# Patient Record
Sex: MALE | Race: BLACK OR AFRICAN AMERICAN | Employment: FULL TIME | URBAN - METROPOLITAN AREA
[De-identification: names, ages, dates, MRNs, and addresses within clinical notes are randomized per-mention and may not be internally consistent; named-entity substitution may affect disease eponyms.]

---

## 2017-03-02 ENCOUNTER — OFFICE VISIT (OUTPATIENT)
Dept: FAMILY MEDICINE CLINIC | Facility: CLINIC | Age: 22
End: 2017-03-02

## 2017-03-02 VITALS
OXYGEN SATURATION: 98 % | SYSTOLIC BLOOD PRESSURE: 124 MMHG | DIASTOLIC BLOOD PRESSURE: 74 MMHG | TEMPERATURE: 98 F | RESPIRATION RATE: 16 BRPM | HEART RATE: 47 BPM

## 2017-03-02 DIAGNOSIS — J06.9 UPPER RESPIRATORY VIRUS: Primary | ICD-10-CM

## 2017-03-02 DIAGNOSIS — J02.9 SORE THROAT: ICD-10-CM

## 2017-03-02 LAB
CONTROL LINE PRESENT WITH A CLEAR BACKGROUND (YES/NO): YES YES/NO
STREP GRP A CUL-SCR: NEGATIVE

## 2017-03-02 PROCEDURE — 99203 OFFICE O/P NEW LOW 30 MIN: CPT | Performed by: NURSE PRACTITIONER

## 2017-03-02 PROCEDURE — 87880 STREP A ASSAY W/OPTIC: CPT | Performed by: NURSE PRACTITIONER

## 2017-03-02 RX ORDER — FLUTICASONE PROPIONATE 50 MCG
2 SPRAY, SUSPENSION (ML) NASAL DAILY
Qty: 1 BOTTLE | Refills: 0 | Status: SHIPPED | OUTPATIENT
Start: 2017-03-02 | End: 2017-03-16

## 2017-03-02 RX ORDER — MONTELUKAST SODIUM 10 MG/1
10 TABLET ORAL DAILY
Qty: 90 TABLET | Refills: 0 | Status: SHIPPED | OUTPATIENT
Start: 2017-03-02 | End: 2017-06-21

## 2017-03-02 NOTE — PROGRESS NOTES
CHIEF COMPLAINT:   Patient presents with:  Sinus Problem: started tuesday. sinus pressure, headache, nausea, runny nose. sore throat. has taken motrin for symptoms. tactile fevers. appetite poor. no coughing. only sneezing. hx of asthma.  has been using NOSE: nostrils patent, clear nasal discharge, nasal mucosa inflamed  THROAT: oral mucosa pink, moist. Posterior pharynx erythematous and injected. No exudates. Tonsils 2/4.   Breath non malodorous   NECK: supple  LUNGS: clear to auscultation bilaterally, no You have a viral upper respiratory illness (URI), which is another term for the common cold. This illness is contagious during the first few days. It is spread through the air by coughing and sneezing.  It may also be spread by direct contact (touching the · Cough with lots of colored sputum (mucus)  · Severe headache; face, neck, or ear pain  · Difficulty swallowing due to throat pain  · Fever of 100.4°F (38°C)  Call 911, or get immediate medical care  Call emergency services right away if any of these occu

## 2017-06-21 ENCOUNTER — OFFICE VISIT (OUTPATIENT)
Dept: FAMILY MEDICINE CLINIC | Facility: CLINIC | Age: 22
End: 2017-06-21

## 2017-06-21 ENCOUNTER — APPOINTMENT (OUTPATIENT)
Dept: LAB | Age: 22
End: 2017-06-21
Attending: FAMILY MEDICINE
Payer: COMMERCIAL

## 2017-06-21 VITALS
BODY MASS INDEX: 32.49 KG/M2 | SYSTOLIC BLOOD PRESSURE: 165 MMHG | HEIGHT: 67 IN | WEIGHT: 207 LBS | RESPIRATION RATE: 16 BRPM | DIASTOLIC BLOOD PRESSURE: 72 MMHG | HEART RATE: 71 BPM | TEMPERATURE: 98 F

## 2017-06-21 DIAGNOSIS — J45.30 MILD PERSISTENT ASTHMA WITHOUT COMPLICATION: Primary | ICD-10-CM

## 2017-06-21 DIAGNOSIS — Z11.3 SCREENING FOR STD (SEXUALLY TRANSMITTED DISEASE): ICD-10-CM

## 2017-06-21 DIAGNOSIS — Z71.85 HPV VACCINE COUNSELING: ICD-10-CM

## 2017-06-21 PROCEDURE — 90651 9VHPV VACCINE 2/3 DOSE IM: CPT | Performed by: FAMILY MEDICINE

## 2017-06-21 PROCEDURE — 90471 IMMUNIZATION ADMIN: CPT | Performed by: FAMILY MEDICINE

## 2017-06-21 PROCEDURE — 87591 N.GONORRHOEAE DNA AMP PROB: CPT

## 2017-06-21 PROCEDURE — 36415 COLL VENOUS BLD VENIPUNCTURE: CPT

## 2017-06-21 PROCEDURE — 87389 HIV-1 AG W/HIV-1&-2 AB AG IA: CPT

## 2017-06-21 PROCEDURE — 99214 OFFICE O/P EST MOD 30 MIN: CPT | Performed by: FAMILY MEDICINE

## 2017-06-21 PROCEDURE — 87491 CHLMYD TRACH DNA AMP PROBE: CPT

## 2017-06-21 PROCEDURE — 86780 TREPONEMA PALLIDUM: CPT

## 2017-06-21 PROCEDURE — 99212 OFFICE O/P EST SF 10 MIN: CPT | Performed by: FAMILY MEDICINE

## 2017-06-21 RX ORDER — ALBUTEROL SULFATE 90 UG/1
2 AEROSOL, METERED RESPIRATORY (INHALATION) EVERY 6 HOURS PRN
Qty: 1 INHALER | Refills: 1 | Status: SHIPPED | OUTPATIENT
Start: 2017-06-21 | End: 2021-09-10

## 2017-06-21 RX ORDER — MONTELUKAST SODIUM 10 MG/1
10 TABLET ORAL DAILY
Qty: 90 TABLET | Refills: 3 | Status: SHIPPED | OUTPATIENT
Start: 2017-06-21 | End: 2018-06-16

## 2017-06-21 NOTE — PROGRESS NOTES
HPI:    Diandra Phelps is a 19year old male presents to clinic for follow up regarding asthma. States that he uses inhaler 1-2 times a month, last time was 3 weeks back when he had a cold. Denies night waking with symptoms.  Takes Singulair every night well-nourished, and in no distress. HENT:   Head: Normocephalic and atraumatic. Neck: Normal range of motion. Neck supple. No thyromegaly present. Cardiovascular: Normal rate, regular rhythm, normal heart sounds and intact distal pulses.     Pulmonary

## 2021-04-09 ENCOUNTER — TELEPHONE (OUTPATIENT)
Dept: FAMILY MEDICINE CLINIC | Facility: CLINIC | Age: 26
End: 2021-04-09

## 2021-04-09 NOTE — TELEPHONE ENCOUNTER
Pt is requesting for us to get his medical records from [facility]   Phone #[phone] SILVESTRE#[phone]      Please advise. Thank you.

## 2021-04-09 NOTE — TELEPHONE ENCOUNTER
Pt advised he'd have to sign a WADE form to have records requested. Pt will either stop by today or will do it on Monday, during his OV.

## 2021-04-12 ENCOUNTER — OFFICE VISIT (OUTPATIENT)
Dept: FAMILY MEDICINE CLINIC | Facility: CLINIC | Age: 26
End: 2021-04-12
Payer: COMMERCIAL

## 2021-04-12 VITALS
RESPIRATION RATE: 18 BRPM | DIASTOLIC BLOOD PRESSURE: 88 MMHG | WEIGHT: 230.63 LBS | HEART RATE: 83 BPM | BODY MASS INDEX: 36.2 KG/M2 | SYSTOLIC BLOOD PRESSURE: 131 MMHG | HEIGHT: 67 IN | TEMPERATURE: 98 F

## 2021-04-12 DIAGNOSIS — R56.9 SEIZURE (HCC): Primary | ICD-10-CM

## 2021-04-12 PROCEDURE — 3075F SYST BP GE 130 - 139MM HG: CPT | Performed by: FAMILY MEDICINE

## 2021-04-12 PROCEDURE — 3008F BODY MASS INDEX DOCD: CPT | Performed by: FAMILY MEDICINE

## 2021-04-12 PROCEDURE — 99202 OFFICE O/P NEW SF 15 MIN: CPT | Performed by: FAMILY MEDICINE

## 2021-04-12 PROCEDURE — 3079F DIAST BP 80-89 MM HG: CPT | Performed by: FAMILY MEDICINE

## 2021-04-12 RX ORDER — LEVETIRACETAM 500 MG/1
TABLET ORAL
COMMUNITY
Start: 2021-03-20 | End: 2021-04-13

## 2021-04-12 RX ORDER — FEXOFENADINE HCL 180 MG/1
180 TABLET ORAL DAILY
COMMUNITY
End: 2021-11-09

## 2021-04-12 NOTE — PROGRESS NOTES
HPI:    Florentino Sparrow is a 30year old male presents to clinic for hospital follow-up. 3 weeks back, patient was in [place]. Was admitted for 5 days, told he had a seizure. Had a seizure many years back as a child, has not had one since then.   Was no rhythm. Heart sounds: Normal heart sounds. Pulmonary:      Effort: Pulmonary effort is normal.      Breath sounds: Normal breath sounds. No wheezing.          ASSESSMENT/PLAN:   (R56.9) Seizure ([address]  (primary encounter diagnosis)  Plan: [facility]

## 2021-04-13 ENCOUNTER — TELEPHONE (OUTPATIENT)
Dept: NEUROLOGY | Facility: CLINIC | Age: 26
End: 2021-04-13

## 2021-04-13 ENCOUNTER — OFFICE VISIT (OUTPATIENT)
Dept: NEUROLOGY | Facility: CLINIC | Age: 26
End: 2021-04-13
Payer: COMMERCIAL

## 2021-04-13 VITALS
DIASTOLIC BLOOD PRESSURE: 68 MMHG | BODY MASS INDEX: 35.63 KG/M2 | HEART RATE: 64 BPM | SYSTOLIC BLOOD PRESSURE: 100 MMHG | HEIGHT: 67 IN | WEIGHT: 227 LBS

## 2021-04-13 DIAGNOSIS — G40.909 NONINTRACTABLE EPILEPSY WITHOUT STATUS EPILEPTICUS, UNSPECIFIED EPILEPSY TYPE (HCC): Primary | ICD-10-CM

## 2021-04-13 PROCEDURE — 3078F DIAST BP <80 MM HG: CPT | Performed by: OTHER

## 2021-04-13 PROCEDURE — 3008F BODY MASS INDEX DOCD: CPT | Performed by: OTHER

## 2021-04-13 PROCEDURE — 3074F SYST BP LT 130 MM HG: CPT | Performed by: OTHER

## 2021-04-13 PROCEDURE — 99205 OFFICE O/P NEW HI 60 MIN: CPT | Performed by: OTHER

## 2021-04-13 RX ORDER — PYRIDOXINE HCL (VITAMIN B6) 100 MG
100 TABLET ORAL DAILY
Qty: 90 TABLET | Refills: 0 | Status: SHIPPED | OUTPATIENT
Start: 2021-04-13 | End: 2021-07-12

## 2021-04-13 RX ORDER — LEVETIRACETAM 500 MG/1
500 TABLET ORAL 2 TIMES DAILY
Qty: 180 TABLET | Refills: 0 | Status: SHIPPED | OUTPATIENT
Start: 2021-04-13 | End: 2021-04-15

## 2021-04-13 RX ORDER — LAMOTRIGINE 25 MG/1
25 TABLET ORAL DAILY
Qty: 175 TABLET | Refills: 0 | Status: SHIPPED | OUTPATIENT
Start: 2021-04-13 | End: 2021-06-14 | Stop reason: DRUGHIGH

## 2021-04-13 NOTE — PATIENT INSTRUCTIONS
SEIZURES   Please do NOT drive until you are cleared by neurology    GENERAL INFORMATION:   A seizure, or convulsion, is uncontrolled jerking of the arms, legs, or face that lasts anywhere from a few seconds to minutes.  Seizures can occur after a head inju patient doesn't awaken shortly after the seizure   b. The patient has new problems such as difficulty seeing, speaking or moving   c. The patient was injured during the seizure   d. The patient has a temperature over 102 F (39C)   e.  The patient vomited an doctor. Objects near the bed may cause injury if someone has a seizure is prone to falling out of bed. Move heavy furniture, floor lamps, night stands and other dangerous objects away from the bed. Mattresses and pillows should be firm and not soft.  Al

## 2021-04-13 NOTE — TELEPHONE ENCOUNTER
Patient seen by Dr. Abimael Willett in office today. Received records from [facility] in [facility]. Placed on Dr. Naresh marr for review.

## 2021-04-13 NOTE — PROGRESS NOTES
[facility] [address]  [address]  [phone]          [facility]  NEW PATIENT EVALUATION  Reason for Admission/Consultation: Seizure  Requested by:   PCP: Carrie Leung MD  Chief Co stopped. In [place] he had a second seizure on 3/14/2021  He last recalls Saturday evening around 11 or 12 PM; he got home around 4 am.   He  Had a conversation w cousin; he does not recall the conversation.    He went into the bathroom and locked the door ? GTC  • Diurnal variation:  First seizure was nocturnal   • Triggering factors: ?sleep deprivation   • Number of seizures in past 1 month: 1   • Number of seizures in past 6 months: 0  • Seizure frequency:  One in 2018 one in 2021   • Maximum seizure free 67\"   Wt 227 lb (103 kg)   BMI 35.55 kg/m²   Body mass index is 35.55 kg/m². Exam:  - General: appears stated age and no distress  - CV: nml S1 and S2; symmetric pulses    Carotids: no bruits   - Pulmonary: CTAB. No signs of respiratory distress.     Ne Absent   - Sensory:   Light touch: normal  Temperature: normal  Pinprick:   Vibration: normal  Proprioception:      Sensory level:      Romberg:   - Cerebellum: No truncal ataxia. No titubations. No dysmetria, no dysdiadochokinesis.  No overshoot.   - Gait/ seizure disorder is unclear, I will start him on lamotrigine, which is an excellent antiepileptic for both generalized epilepsy and focal epilepsy's. I considered starting him on lacosamide/Vimpat.   However is unclear whether the patient has a generalized an indication to initiate treatment. 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 which greater than 50% of the time was spent in patient education, counseling, and coordination of care as described above.    Issues discussed: Diagnosis and implications on future health, benefits and side effects of present and future medications, test r

## 2021-04-14 ENCOUNTER — TELEPHONE (OUTPATIENT)
Dept: NEUROLOGY | Facility: CLINIC | Age: 26
End: 2021-04-14

## 2021-04-14 DIAGNOSIS — G40.909 NONINTRACTABLE EPILEPSY WITHOUT STATUS EPILEPTICUS, UNSPECIFIED EPILEPSY TYPE (HCC): Primary | ICD-10-CM

## 2021-04-14 NOTE — TELEPHONE ENCOUNTER
Called & spoke with patient. Patient states he picked up lamoTRIgine (LAMICTAL) 25 MG Oral Tab & Pyridoxine HCl 100 MG Oral Tab from [facility] but needs Keppra 500mg sent to [facility] instead.

## 2021-04-14 NOTE — TELEPHONE ENCOUNTER
Refill request for levETIRAcetam (KEPPRA) 500 MG Oral Tab, Route:  Take 1 tablet (500 mg total) by mouth 2 (two) times daily, 180 Tablets with 0 Refills    LOV: 4/13/21      Patient would like prescription filled at [facility]

## 2021-04-14 NOTE — TELEPHONE ENCOUNTER
Called [facility] & spoke with Girish advising patient would like Keppra 500mg sent to different pharmacy.

## 2021-04-15 RX ORDER — LEVETIRACETAM 500 MG/1
500 TABLET ORAL 2 TIMES DAILY
Qty: 180 TABLET | Refills: 0 | Status: SHIPPED | OUTPATIENT
Start: 2021-04-15 | End: 2021-06-14 | Stop reason: DRUGHIGH

## 2021-04-21 NOTE — TELEPHONE ENCOUNTER
Called & spoke with patient. Patient states he wanted to let Akash Holloway know he will start taking Allegra but would like to get approval from Akash Holloway.  Patient also stated he will like to get Lamotrigine & Levetiracetam labs done at [address] [place] [address] urgent

## 2021-04-21 NOTE — TELEPHONE ENCOUNTER
Called [facility] & spoke with Jovana Garcia. Jovana Garcia stated their fax number is [phone]. Advised I will be faxing patient lab orders over to their clinic.

## 2021-04-26 NOTE — TELEPHONE ENCOUNTER
Called [facility] & spoke with Sheng Holloway. Advised we faxed over lab orders for patient but were told by patient they were not received. Sheng Holloway provided fax number [phone].  Advised we will fax over the labs then call to confirmed they were receiv

## 2021-04-26 NOTE — TELEPHONE ENCOUNTER
Patient called back, advised below. Patient states when he went to [facility] to get blood work done they stated they did not receive lab orders. Advised we will give  clinic a call & refax orders if need be. Patient agreed with terms.

## 2021-04-26 NOTE — TELEPHONE ENCOUNTER
Called patient & left message advising [facility] received lab orders. Advised to call back our office if any questions. Patient has HIPAA consent to leave detailed message on cell phone.

## 2021-04-26 NOTE — TELEPHONE ENCOUNTER
Called patient & left message advising per  patient can take Allegra & lab orders were sent to [facility] clinic. Patient had HIPAA consent to leave detailed message on cell.

## 2021-04-26 NOTE — TELEPHONE ENCOUNTER
Called [facility] & spoke with Karla Darnell to confirm they received patient's lab orders via fax. Karla Darnell confirmed fax was received.

## 2021-05-11 ENCOUNTER — TELEPHONE (OUTPATIENT)
Dept: NEUROLOGY | Facility: CLINIC | Age: 26
End: 2021-05-11

## 2021-05-11 ENCOUNTER — OFFICE VISIT (OUTPATIENT)
Dept: NEUROLOGY | Facility: CLINIC | Age: 26
End: 2021-05-11
Payer: COMMERCIAL

## 2021-05-11 VITALS — HEART RATE: 105 BPM | DIASTOLIC BLOOD PRESSURE: 93 MMHG | SYSTOLIC BLOOD PRESSURE: 143 MMHG

## 2021-05-11 DIAGNOSIS — G40.909 NONINTRACTABLE EPILEPSY WITHOUT STATUS EPILEPTICUS, UNSPECIFIED EPILEPSY TYPE (HCC): Primary | ICD-10-CM

## 2021-05-11 PROCEDURE — 3080F DIAST BP >= 90 MM HG: CPT | Performed by: OTHER

## 2021-05-11 PROCEDURE — 3077F SYST BP >= 140 MM HG: CPT | Performed by: OTHER

## 2021-05-11 PROCEDURE — 99215 OFFICE O/P EST HI 40 MIN: CPT | Performed by: OTHER

## 2021-05-11 NOTE — PROGRESS NOTES
[facility] 37  [address] [facility], [address] [place] [address]  [phone]        Neurology Clinic Follow Up Note    Chief Complaint:  Medication Follow-Up (having side effects-states hes feeling very fatigue, drowsy, mood swings, na was unlikely that he had a false positive for amphetamines, but was unaware of the sensitivity and specificity of urine tox for cannabis. Regardless, there was no EEG report and no neurology note in the outside records.     We had a lengthy discussion with Daily   • lamoTRIgine (LAMICTAL) 25 mg, Oral, Daily, Start 25 mg pills, #170 Weeks 1 & 2: One 25 mg pill each eveningWeeks 3 & 4: 1 pill, twice a day Week 5: 1 pill each morning, 2 pills each evening Week 6: 2 pills, twice per day Week 7: 2 pills each chrissyn Rapid movements:   Leg Drift: None   R     L   Knee Extensors     Foot Taps: Foot taps are symmetric. Asterixis: No asterixis noted. Tremor: None.       Reflexes:    C5 C6 C7  L4 S1   R 2+ 2+  2+ 2+   L 2+ 2+  2+ 2+    Frontal release signs:Not assess his Lamictal level checked. Plan   1. Newly diagnosed epilepsy  Diagnostics:  - LAMOTRIGINE (LAMICTAL), SERUM; Future  - EEG; EEG was ordered to help evaluate for seizure focus.  - MRI BRAIN (W+WO) (CPT=70553);  Future  - LEVETIRACETAM, S; Future; Kep

## 2021-05-11 NOTE — TELEPHONE ENCOUNTER
Contacted AIM online to initiate authorization for Brain MRI w+wo CPT 88671 to be done at [address] [place] [address].     Status: Approved with order #[id number] valid 5/11/21-6/9/21  All approved cpt codes with this authorization are [address], [facility], [facility], *[id number], *[id number]    Patient

## 2021-05-17 ENCOUNTER — TELEPHONE (OUTPATIENT)
Dept: NEUROLOGY | Facility: CLINIC | Age: 26
End: 2021-05-17

## 2021-05-17 NOTE — TELEPHONE ENCOUNTER
Can this order for ((LAMO) LAMOTRIGINE (LAMICTAL) be faxed to [address] [place] [address] to fax # [phone]. He wants to have it drawn here at this location.

## 2021-05-17 NOTE — TELEPHONE ENCOUNTER
Lab orders printed and faxed to number below. Spoke to patient and notified him that orders were faxed. He was thankful for the return call.

## 2021-05-19 NOTE — TELEPHONE ENCOUNTER
Spoke to patient. He states that he is taking keppra 500 mg and lamotrigine 25 mg and would like to get off one of the medications. He feels his mood has been off; he has been feeling depressed.   No intent to hurt himself or others but has dealt with depre

## 2021-05-20 NOTE — TELEPHONE ENCOUNTER
Spoke to patient and notified him of below. He was understanding. He will get labs done today or tomorrow and will await a return call on how to wean off. He was very thankful for the return call.

## 2021-05-20 NOTE — TELEPHONE ENCOUNTER
Yes, the goal is to wean him off keppra. His lamictal level tomorrow will also be useful. I can give him recommendations about weaning once we get a level.   Thanks,  [employer]

## 2021-06-07 ENCOUNTER — TELEPHONE (OUTPATIENT)
Dept: NEUROLOGY | Facility: CLINIC | Age: 26
End: 2021-06-07

## 2021-06-07 NOTE — TELEPHONE ENCOUNTER
Called & spoke with patient advising below. Patient states he is actually awaiting his new lab orders to be sent to [facility] so he can get the labs done. Advised we will fax those orders to [facility].

## 2021-06-07 NOTE — TELEPHONE ENCOUNTER
Called [facility] & spoke with Zohreh. Advised we are requesting patient Keppra & Lamotrigine lab results to be faxed to our office. Zohreh stated their fax machine is under maintenance but the results should be sent within the hour.

## 2021-06-10 ENCOUNTER — MED REC SCAN ONLY (OUTPATIENT)
Dept: NEUROLOGY | Facility: CLINIC | Age: 26
End: 2021-06-10

## 2021-06-14 ENCOUNTER — TELEPHONE (OUTPATIENT)
Dept: NEUROLOGY | Facility: CLINIC | Age: 26
End: 2021-06-14

## 2021-06-14 ENCOUNTER — OFFICE VISIT (OUTPATIENT)
Dept: NEUROLOGY | Facility: CLINIC | Age: 26
End: 2021-06-14
Payer: COMMERCIAL

## 2021-06-14 VITALS
SYSTOLIC BLOOD PRESSURE: 137 MMHG | DIASTOLIC BLOOD PRESSURE: 76 MMHG | WEIGHT: 220 LBS | BODY MASS INDEX: 34.53 KG/M2 | HEIGHT: 67 IN

## 2021-06-14 DIAGNOSIS — G40.909 NONINTRACTABLE EPILEPSY WITHOUT STATUS EPILEPTICUS, UNSPECIFIED EPILEPSY TYPE (HCC): Primary | ICD-10-CM

## 2021-06-14 PROCEDURE — 3008F BODY MASS INDEX DOCD: CPT | Performed by: OTHER

## 2021-06-14 PROCEDURE — 3078F DIAST BP <80 MM HG: CPT | Performed by: OTHER

## 2021-06-14 PROCEDURE — 3075F SYST BP GE 130 - 139MM HG: CPT | Performed by: OTHER

## 2021-06-14 PROCEDURE — 99214 OFFICE O/P EST MOD 30 MIN: CPT | Performed by: OTHER

## 2021-06-14 RX ORDER — LAMOTRIGINE 25 MG/1
75 TABLET ORAL 2 TIMES DAILY
Qty: 42 TABLET | Refills: 0 | Status: SHIPPED | OUTPATIENT
Start: 2021-06-14 | End: 2021-06-21

## 2021-06-14 RX ORDER — LAMOTRIGINE 100 MG/1
100 TABLET ORAL 2 TIMES DAILY
Qty: 180 TABLET | Refills: 0 | Status: SHIPPED | OUTPATIENT
Start: 2021-06-21 | End: 2021-06-14

## 2021-06-14 RX ORDER — LAMOTRIGINE 25 MG/1
75 TABLET ORAL 2 TIMES DAILY
Qty: 42 TABLET | Refills: 0 | Status: SHIPPED | OUTPATIENT
Start: 2021-06-14 | End: 2021-06-14

## 2021-06-14 RX ORDER — LEVETIRACETAM 750 MG/1
750 TABLET ORAL 2 TIMES DAILY
Qty: 180 TABLET | Refills: 0 | Status: SHIPPED | OUTPATIENT
Start: 2021-06-14 | End: 2021-06-14

## 2021-06-14 RX ORDER — LAMOTRIGINE 100 MG/1
100 TABLET ORAL 2 TIMES DAILY
Qty: 180 TABLET | Refills: 0 | Status: SHIPPED | OUTPATIENT
Start: 2021-06-21 | End: 2021-09-19

## 2021-06-14 RX ORDER — LEVETIRACETAM 750 MG/1
750 TABLET ORAL 2 TIMES DAILY
Qty: 180 TABLET | Refills: 0 | Status: SHIPPED | OUTPATIENT
Start: 2021-06-14 | End: 2021-09-12

## 2021-06-14 NOTE — PROGRESS NOTES
Neym[facility] 37  [address] [facility], [address] [place] [address]  [phone]        Neurology Clinic Follow Up Note    Chief Complaint:  Seizures (LOV 5/11/21. Last seizure March 2021.  Levetiracetam 4.7 and Lamotrigrine level 1.6 on 6/ attending intubated for airway protection. U tox was positive for amphetamines and? Cannabis    I discussed the results of the outside records. The patient had multiple family members present either via phone or in the room with him.  His mom and dad were improve as someone is on antiepileptics. I also explained that once his Lamictal level is therapeutic we can be weaned off his other antiepileptic. I also offered him a second opinion.  I advised him I could refer him to multiple academic centers to be seen Nerves: No gaze preference. Visual fields: None pupils are 4 mm briskly constricting to 3 mm and equally round and reactive to light  in a well lit room. No rAPD. EOMI. No nystagmus. No ptosis. V1-V3 intact B/L to light touch. No pathological facial asymmet for follow-up for newly diagnosed epilepsy. 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 He had severe cognitive side effects, and the medication was stopped.  While in [place] he then had multiple seizures completed note should be presumed not to have been done. Disclaimer: This record was dictated using  [address]. There may be errors due to voice recognition problems that were not realized and corrected during the completion of the note.

## 2021-06-14 NOTE — TELEPHONE ENCOUNTER
Contacted AIM online to initiate authorization for Brain MRI w+wo CPT 25477 to be done at [address] [place] [address].   Coverage is active    Status: Approved with order #[id number] valid 6/14/21-7/13/21    All approved cpt codes with this authorization are 41523, 77436, 49427, *7

## 2021-06-14 NOTE — PROGRESS NOTES
Patient requests pharmacy change to [facility] from [facility]. No longer has insurance at [facility].

## 2021-07-12 ENCOUNTER — OFFICE VISIT (OUTPATIENT)
Dept: NEUROLOGY | Facility: CLINIC | Age: 26
End: 2021-07-12
Payer: COMMERCIAL

## 2021-07-12 VITALS
DIASTOLIC BLOOD PRESSURE: 80 MMHG | SYSTOLIC BLOOD PRESSURE: 140 MMHG | WEIGHT: 220 LBS | HEIGHT: 67 IN | BODY MASS INDEX: 34.53 KG/M2

## 2021-07-12 DIAGNOSIS — G40.909 NONINTRACTABLE EPILEPSY WITHOUT STATUS EPILEPTICUS, UNSPECIFIED EPILEPSY TYPE (HCC): Primary | ICD-10-CM

## 2021-07-12 PROCEDURE — 3008F BODY MASS INDEX DOCD: CPT | Performed by: OTHER

## 2021-07-12 PROCEDURE — 3077F SYST BP >= 140 MM HG: CPT | Performed by: OTHER

## 2021-07-12 PROCEDURE — 3079F DIAST BP 80-89 MM HG: CPT | Performed by: OTHER

## 2021-07-12 PROCEDURE — 99214 OFFICE O/P EST MOD 30 MIN: CPT | Performed by: OTHER

## 2021-07-12 NOTE — PROGRESS NOTES
[facility] 37  [address] [phone]        Neurology Clinic Follow Up Note    Chief Complaint:  Test Results and Medication Follow-Up      HPI:   Cynthia Buitrago is a very pleasant 30year old leslee.    05/11/21 Interval History/Subjective :   Review of outside records:  Records were sent from [facility] in [address]   patient was admitted on 3/14/2021 he was discharged on 3/20/2021.   Patient had 3 seizures in route; when he a drugs. He also reports significant depression on medication. He denies any SI.  I explained to him of the potential harms of untreated epilepsy including sudden unexplained death in epilepsy and the associated loss of independence with the inability to driv Speech is spontaneous, fluent, and prosodic. Comprehension and repetition intact. Phrase length and rate are normal. No paraphasic errors, neologisms, anomia, acalculia, apraxia, anosognosia, or R/L confusion.   - Cranial Nerves: No gaze preference.  Chelsey Hernandez also from June 7.     Diagnostic studies:  Performed an independent visualization of no imaging to review  Imaging revealed: No imaging to review    Assessment     Josep Kimball is a very pleasant 78-year-old young man/ who presents for follow- only. No climbing to heights. • If he has been seizure-free for 1 month with therapeutic drug levels he can resume driving. • Cannot drink until directed by neurology.                   Education Provided  Education/Instructions given to: patient   Christian Houston

## 2021-07-12 NOTE — PATIENT INSTRUCTIONS
If your lamictal level is therapeutic we will wean you off keppra. If it is still low we will have to increase the dose of both medicines, since the keppra level was not therapeutic. This means there is not enough drug in your blood to prevent a seizure.

## 2021-08-02 NOTE — TELEPHONE ENCOUNTER
Mildred Calles [place] Good afternoon, Thank you for obtaining approval however authorization has  and patient is scheduled for date of service 21.  Please obtain new approval. Thank you      Received above message Monday morning as our

## 2021-09-10 RX ORDER — ALBUTEROL SULFATE 90 UG/1
2 AEROSOL, METERED RESPIRATORY (INHALATION) EVERY 6 HOURS PRN
Qty: 1 EACH | Refills: 1 | Status: SHIPPED | OUTPATIENT
Start: 2021-09-10 | End: 2021-09-11

## 2021-09-11 RX ORDER — ALBUTEROL SULFATE 90 UG/1
AEROSOL, METERED RESPIRATORY (INHALATION)
Qty: 25.5 G | Refills: 0 | Status: SHIPPED | OUTPATIENT
Start: 2021-09-11

## 2021-09-21 ENCOUNTER — OFFICE VISIT (OUTPATIENT)
Dept: NEUROLOGY | Facility: CLINIC | Age: 26
End: 2021-09-21
Payer: COMMERCIAL

## 2021-09-21 VITALS
BODY MASS INDEX: 34.53 KG/M2 | DIASTOLIC BLOOD PRESSURE: 74 MMHG | SYSTOLIC BLOOD PRESSURE: 122 MMHG | WEIGHT: 220 LBS | HEIGHT: 67 IN

## 2021-09-21 DIAGNOSIS — G40.909 NONINTRACTABLE EPILEPSY WITHOUT STATUS EPILEPTICUS, UNSPECIFIED EPILEPSY TYPE (HCC): Primary | ICD-10-CM

## 2021-09-21 PROCEDURE — 3078F DIAST BP <80 MM HG: CPT | Performed by: OTHER

## 2021-09-21 PROCEDURE — 99214 OFFICE O/P EST MOD 30 MIN: CPT | Performed by: OTHER

## 2021-09-21 PROCEDURE — 3074F SYST BP LT 130 MM HG: CPT | Performed by: OTHER

## 2021-09-21 PROCEDURE — 3008F BODY MASS INDEX DOCD: CPT | Performed by: OTHER

## 2021-09-21 RX ORDER — LAMOTRIGINE 100 MG/1
100 TABLET ORAL DAILY
Qty: 90 TABLET | Refills: 1 | Status: SHIPPED | OUTPATIENT
Start: 2021-09-21 | End: 2021-11-09

## 2021-09-21 RX ORDER — LEVETIRACETAM 750 MG/1
750 TABLET ORAL 2 TIMES DAILY
Qty: 180 TABLET | Refills: 1 | Status: SHIPPED | OUTPATIENT
Start: 2021-09-21 | End: 2022-03-20

## 2021-09-21 RX ORDER — LAMOTRIGINE 25 MG/1
TABLET ORAL
Qty: 70 TABLET | Refills: 0 | Status: SHIPPED | OUTPATIENT
Start: 2021-09-21 | End: 2021-09-21

## 2021-09-21 RX ORDER — LAMOTRIGINE 25 MG/1
TABLET ORAL
Qty: 203 TABLET | Refills: 0 | Status: SHIPPED | OUTPATIENT
Start: 2021-09-21 | End: 2021-11-09 | Stop reason: DRUGHIGH

## 2021-09-21 RX ORDER — LEVETIRACETAM 750 MG/1
750 TABLET ORAL 2 TIMES DAILY
COMMUNITY
End: 2021-09-21

## 2021-09-21 RX ORDER — LAMOTRIGINE 200 MG/1
100 TABLET ORAL DAILY
Qty: 45 TABLET | Refills: 0 | Status: SHIPPED | OUTPATIENT
Start: 2021-10-21 | End: 2021-09-21

## 2021-09-21 NOTE — PROGRESS NOTES
Neym[facility] 37  [address]  [phone]        Neurology Clinic Follow Up Note    Chief Complaint:  Seizures (f/u on seizures LOV 7/12/21. pt here to review lab results done last thursday.  Pt needs misses the evening dose of Keppra. He says he does his best to be adherent. He states that he will forget his evening dose of explained videogames and falls asleep on his couch. He has not been driving. He uses [employer] or has his friend give him a ride. they will begin to have more seizures with him with increasing frequency.  Explained that he is not allowed to drive until he is cleared by neurologist. Patient is very concerned about his cognitive function and the adverse effects of the antiepileptic drug Speech is spontaneous, fluent, and prosodic. Comprehension and repetition intact. Phrase length and rate are normal. No paraphasic errors, neologisms, anomia, acalculia, apraxia, anosognosia, or R/L confusion.   - Cranial Nerves: No gaze preference.  Visual Keppra level is 4.7 MCG per mL. This is also from June 7.     Diagnostic studies:  Performed an independent visualization of no imaging to review  Imaging revealed: No imaging to review    Assessment     Maricel Wright is a very pleasant 42-year-old yo driving. • Cannot drink until directed by neurology.                   Education Provided  Education/Instructions given to: patient   Barriers to Learning: None  Content: Refer to note above   Evaluation/Outcome: Verbalized understanding    This document i

## 2021-09-21 NOTE — PATIENT INSTRUCTIONS
We need to go up on your lamictal. You are close to being therapeutic. Your level was 3.2 on 9/16 and it needs to be 4.0  Lets go up on the dose to make it 200 mg twice a day. Please get a level checked in 2 weeks. Continue keppra 750 BID.    Please g

## 2021-11-02 RX ORDER — LAMOTRIGINE 150 MG/1
150 TABLET ORAL DAILY
Qty: 60 TABLET | Refills: 0 | Status: SHIPPED | OUTPATIENT
Start: 2021-11-02 | End: 2021-11-09 | Stop reason: DRUGHIGH

## 2021-11-02 RX ORDER — LAMOTRIGINE 25 MG/1
TABLET ORAL
Qty: 120 TABLET | Refills: 0 | Status: SHIPPED | OUTPATIENT
Start: 2021-11-02 | End: 2021-11-09

## 2021-11-02 NOTE — TELEPHONE ENCOUNTER
Spoke to patient. He lost lamotrigine 100 mg tabs and 25 mg tabs while he was out of town this past weekend. He has been titrating up on lamotrigine and is currently on 150 mg BID.   On Wednesday, he is to increase to 175 mg in am and 150 mg in pm for one

## 2021-11-09 ENCOUNTER — OFFICE VISIT (OUTPATIENT)
Dept: NEUROLOGY | Facility: CLINIC | Age: 26
End: 2021-11-09
Payer: COMMERCIAL

## 2021-11-09 VITALS
BODY MASS INDEX: 34.53 KG/M2 | DIASTOLIC BLOOD PRESSURE: 80 MMHG | HEART RATE: 90 BPM | SYSTOLIC BLOOD PRESSURE: 120 MMHG | WEIGHT: 220 LBS | HEIGHT: 67 IN

## 2021-11-09 DIAGNOSIS — G40.909 NONINTRACTABLE EPILEPSY WITHOUT STATUS EPILEPTICUS, UNSPECIFIED EPILEPSY TYPE (HCC): Primary | ICD-10-CM

## 2021-11-09 PROCEDURE — 99214 OFFICE O/P EST MOD 30 MIN: CPT | Performed by: OTHER

## 2021-11-09 PROCEDURE — 3008F BODY MASS INDEX DOCD: CPT | Performed by: OTHER

## 2021-11-09 PROCEDURE — 3079F DIAST BP 80-89 MM HG: CPT | Performed by: OTHER

## 2021-11-09 PROCEDURE — 3074F SYST BP LT 130 MM HG: CPT | Performed by: OTHER

## 2021-11-09 RX ORDER — LAMOTRIGINE 200 MG/1
200 TABLET ORAL DAILY
Qty: 90 TABLET | Refills: 1 | Status: SHIPPED | OUTPATIENT
Start: 2021-11-09 | End: 2022-05-08

## 2021-11-09 NOTE — PATIENT INSTRUCTIONS
1. We will wean your keppra. Cut the tablets in half and go down by half a tablet every week. Start with the evening dose. Week 1: 375 AM (half a tablet) and 750 PM  Week 2: 375 AM and 375 PM  Week 3: 375 PM only then stop.     2. Continue lamictal 150 BID

## 2021-11-09 NOTE — PROGRESS NOTES
Neym[facility] 37  [address]  [phone]        Neurology Clinic Follow Up Note    Chief Complaint:  Seizures (LOV: 9/21/21.  Pt presents for follow up- has been taking lamotrigine 25mg and 150mg with younger;  at 22-20. He is exercising.      21 Interval History/Subjective:  Overall he is doing much better since his last clinic visit. He has less anxiety and concerned about being on antiepileptics.   He has constipation now; taking probiotic specificity of urine tox for cannabis. Regardless, there was no EEG report and no neurology note in the outside records. We had a lengthy discussion with multiple family members. The main point is that the patient had a seizure while asleep years ago.  A (morning and evening) Week 3: in addition to 100mg twice a day take two 25 mg in the morning and one tablet in the evening. Week 4: in addition to 100mg twice a day take two 25mg twice a day (morning and evening).  Week 5: in addition to 100mg twice a day t noted. Palate and uvula elevate symmetrically. Shoulder shrug symmetric.  - Fundoscopic exam:normal w/o hemorrhages, exudates, or papilledema. No attenuation. No pallor.  - Motor:  normal tone/bulk. No interosseous wasting.  No flattening of hypothenar quinn seizures requiring intubation at an outside hospital. He was started on Keppra but has had significant diarrhea, and thus is also been started on Lamictal with a goal of weaning Keppra. He is now therapeutic on lamictal, will wean keppra. Plan   1.

## 2022-01-21 ENCOUNTER — HOSPITAL ENCOUNTER (OUTPATIENT)
Dept: ELECTROPHYSIOLOGY | Facility: HOSPITAL | Age: 27
Discharge: HOME | End: 2022-01-21
Attending: Other
Payer: COMMERCIAL

## 2022-01-21 PROCEDURE — 95816 EEG AWAKE AND DROWSY: CPT | Performed by: OTHER

## 2022-01-21 NOTE — PROCEDURES
EEG report    REFERRING PHYSICIAN: Mary Lou Serrato DO    PCP and phone number:  Georgiana Plaza MD  [phone]    TECHNIQUE: 21 channels of EEG, 2 channels of EOG, and 1 channel of EKG were recorded utilizing the International 10/20 System.  The recordin Knee hemarthrosis, left

## 2022-02-04 ENCOUNTER — HOSPITAL ENCOUNTER (OUTPATIENT)
Dept: MRI IMAGING | Facility: HOSPITAL | Age: 27
Discharge: HOME | End: 2022-02-04
Attending: Other
Payer: COMMERCIAL

## 2022-02-04 DIAGNOSIS — G40.909 NONINTRACTABLE EPILEPSY WITHOUT STATUS EPILEPTICUS, UNSPECIFIED EPILEPSY TYPE (HCC): ICD-10-CM

## 2022-02-04 PROCEDURE — 70553 MRI BRAIN STEM W/O & W/DYE: CPT | Performed by: OTHER

## 2022-02-04 PROCEDURE — A9575 INJ GADOTERATE MEGLUMI 0.1ML: HCPCS | Performed by: OTHER

## 2022-03-09 NOTE — TELEPHONE ENCOUNTER
Detail Level: Detailed Received call from [facility] in Insurance verification to obtain new authorization for Brain MRI w+wo as patient never had imaging and is scheduled 2/4/22    New authorization initiated via [email or web address] online for Brain MRI w+wo  Approved with order #[id number] valid Quality 226: Preventive Care And Screening: Tobacco Use: Screening And Cessation Intervention: Patient screened for tobacco use and is an ex/non-smoker Quality 130: Documentation Of Current Medications In The Medical Record: Current Medications Documented Quality 431: Preventive Care And Screening: Unhealthy Alcohol Use - Screening: Patient not identified as an unhealthy alcohol user when screened for unhealthy alcohol use using a systematic screening method

## 2022-03-29 RX ORDER — LAMOTRIGINE 200 MG/1
200 TABLET ORAL DAILY
Qty: 90 TABLET | Refills: 1 | Status: SHIPPED | OUTPATIENT
Start: 2022-03-29 | End: 2022-09-25

## 2022-04-18 DIAGNOSIS — G40.909 NONINTRACTABLE EPILEPSY WITHOUT STATUS EPILEPTICUS, UNSPECIFIED EPILEPSY TYPE (HCC): ICD-10-CM

## 2022-04-18 RX ORDER — LAMOTRIGINE 200 MG/1
200 TABLET ORAL DAILY
Qty: 90 TABLET | Refills: 1 | OUTPATIENT
Start: 2022-04-18 | End: 2022-10-15

## 2022-05-18 ENCOUNTER — HOSPITAL ENCOUNTER (OUTPATIENT)
Dept: ELECTROPHYSIOLOGY | Facility: HOSPITAL | Age: 27
Discharge: HOME | End: 2022-05-18
Attending: Other
Payer: COMMERCIAL

## 2022-05-18 PROCEDURE — 95700 EEG CONT REC W/VID EEG TECH: CPT

## 2022-05-18 PROCEDURE — 95708 EEG WO VID EA 12-26HR UNMNTR: CPT

## 2022-05-19 ENCOUNTER — HOSPITAL ENCOUNTER (OUTPATIENT)
Dept: ELECTROPHYSIOLOGY | Facility: HOSPITAL | Age: 27
Discharge: HOME | End: 2022-05-19
Attending: Other
Payer: COMMERCIAL

## 2022-05-19 PROCEDURE — 95708 EEG WO VID EA 12-26HR UNMNTR: CPT

## 2022-05-20 ENCOUNTER — HOSPITAL ENCOUNTER (OUTPATIENT)
Dept: ELECTROPHYSIOLOGY | Facility: HOSPITAL | Age: 27
Discharge: HOME | End: 2022-05-20
Attending: Other
Payer: COMMERCIAL

## 2022-05-20 PROCEDURE — 95708 EEG WO VID EA 12-26HR UNMNTR: CPT

## 2022-05-20 NOTE — ADDENDUM NOTE
Encounter addended by: Sejal Peralta on: 5/20/2022 10:04 AM   Actions taken: Charge Capture section accepted

## 2022-05-23 NOTE — ADDENDUM NOTE
Encounter addended by: Irineo Rojas MD on: 5/23/2022 11:27 AM   Actions taken: Clinical Note Signed, Charge Capture section accepted

## 2022-05-23 NOTE — ADDENDUM NOTE
Encounter addended by: Carol Peres on: 5/23/2022 7:35 AM   Actions taken: Charge Capture section accepted

## 2022-06-09 ENCOUNTER — TELEMEDICINE (OUTPATIENT)
Dept: NEUROLOGY | Facility: CLINIC | Age: 27
End: 2022-06-09
Payer: COMMERCIAL

## 2022-06-09 DIAGNOSIS — G43.009 MIGRAINE WITHOUT AURA AND WITHOUT STATUS MIGRAINOSUS, NOT INTRACTABLE: ICD-10-CM

## 2022-06-09 DIAGNOSIS — G40.909 NONINTRACTABLE EPILEPSY WITHOUT STATUS EPILEPTICUS, UNSPECIFIED EPILEPSY TYPE (HCC): Primary | ICD-10-CM

## 2022-06-09 PROCEDURE — 99214 OFFICE O/P EST MOD 30 MIN: CPT | Performed by: OTHER

## 2022-06-09 RX ORDER — LAMOTRIGINE 200 MG/1
200 TABLET ORAL DAILY
Qty: 90 TABLET | Refills: 3 | Status: SHIPPED | OUTPATIENT
Start: 2022-06-09 | End: 2023-06-09

## 2022-06-09 RX ORDER — CALCIUM CARBONATE 300MG(750)
400 TABLET,CHEWABLE ORAL DAILY
Qty: 180 TABLET | Refills: 1 | Status: SHIPPED | OUTPATIENT
Start: 2022-06-09 | End: 2023-06-09

## 2022-09-27 RX ORDER — ALBUTEROL SULFATE 90 UG/1
2 AEROSOL, METERED RESPIRATORY (INHALATION) EVERY 6 HOURS PRN
Qty: 25.5 G | Refills: 0 | Status: SHIPPED | OUTPATIENT
Start: 2022-09-27 | End: 2023-02-13

## 2022-09-27 NOTE — TELEPHONE ENCOUNTER
Refill passed per [facility], [facility] protocol. Requested Prescriptions   Pending Prescriptions Disp Refills    albuterol 108 (90 Base) MCG/ACT Inhalation Aero Soln 25.5 g 0     Sig: Inhale 2 puffs into the lungs every 6 (six) hours as needed for Wheezing.         Asthma & COPD Medication Protocol Failed - 9/26/2022 11:59 AM        Failed - In person appointment or virtual visit in the past 6 mos or appointment in next 3 mos       Recent Outpatient Visits              3 months ago Nonintractable epilepsy without status epilepticus, unspecified epilepsy type [facility])    CARMENZA Garcia, DO    Telemedicine    10 months ago Nonintractable epilepsy without status epilepticus, unspecified epilepsy type [facility])    Maria Alejandra Gomez [address], [place], DO    Office Visit    1 year ago Nonintractable epilepsy without status epilepticus, unspecified epilepsy type [facility])    Maria Alejandra Gomez [address], [place], DO    Office Visit    1 year ago Nonintractable epilepsy without status epilepticus, unspecified epilepsy type [facility])    Maria Alejandra Gomez [address], [place], DO    Office Visit    1 year ago Nonintractable epilepsy without status epilepticus, unspecified epilepsy type [facility])    Maria Alejandra Gomez [address], [place], DO    Office Visit                     Recent Outpatient Visits              3 months ago Nonintractable epilepsy without status epilepticus, unspecified epilepsy type [facility])    CARMENZA Garcia, DO    Telemedicine    10 months ago Nonintractable epilepsy without status epilepticus, unspecified epilepsy type [facility])    Maria Alejandra Gomez [address], [place], DO    Office Visit    1 year ago Nonintractable epilepsy without status epilepticus, unspecified epilepsy type [facility])    [facility] [address], [address], [place], [place]    Office Visit    1 year ago Nonintractable epilepsy without status epilepticus, unspecified epilepsy type [facility])    [address], [place], [place]    Office Visit    1 year ago Nonintractable epilepsy without status epilepticus, unspecified epilepsy type [facility])    [address], [place], [place]    Office Visit

## 2022-09-27 NOTE — TELEPHONE ENCOUNTER
Inform patient that his inhaler has been refilled but assist him to schedule an annual physical for any further refills.

## 2023-02-13 ENCOUNTER — LAB ENCOUNTER (OUTPATIENT)
Dept: LAB | Facility: REFERENCE LAB | Age: 28
End: 2023-02-13
Attending: FAMILY MEDICINE
Payer: COMMERCIAL

## 2023-02-13 ENCOUNTER — OFFICE VISIT (OUTPATIENT)
Facility: CLINIC | Age: 28
End: 2023-02-13
Payer: COMMERCIAL

## 2023-02-13 VITALS
WEIGHT: 231 LBS | OXYGEN SATURATION: 98 % | HEIGHT: 67 IN | DIASTOLIC BLOOD PRESSURE: 82 MMHG | SYSTOLIC BLOOD PRESSURE: 136 MMHG | HEART RATE: 79 BPM | BODY MASS INDEX: 36.26 KG/M2

## 2023-02-13 DIAGNOSIS — J06.9 VIRAL URI: ICD-10-CM

## 2023-02-13 DIAGNOSIS — Z00.00 PHYSICAL EXAM, ANNUAL: Primary | ICD-10-CM

## 2023-02-13 DIAGNOSIS — J45.20 MILD INTERMITTENT ASTHMA WITHOUT COMPLICATION: ICD-10-CM

## 2023-02-13 LAB
ALBUMIN SERPL-MCNC: 3.7 G/DL (ref 3.4–5)
ALBUMIN/GLOB SERPL: 0.9 {RATIO} (ref 1–2)
ALP LIVER SERPL-CCNC: 74 U/L
ALT SERPL-CCNC: 26 U/L
ANION GAP SERPL CALC-SCNC: 6 MMOL/L (ref 0–18)
AST SERPL-CCNC: 21 U/L (ref 15–37)
BILIRUB SERPL-MCNC: 0.3 MG/DL (ref 0.1–2)
BUN BLD-MCNC: 12 MG/DL (ref 7–18)
BUN/CREAT SERPL: 9.8 (ref 10–20)
CALCIUM BLD-MCNC: 9.1 MG/DL (ref 8.5–10.1)
CHLORIDE SERPL-SCNC: 108 MMOL/L (ref 98–112)
CHOLEST SERPL-MCNC: 191 MG/DL (ref ?–200)
CO2 SERPL-SCNC: 29 MMOL/L (ref 21–32)
CREAT BLD-MCNC: 1.23 MG/DL
EST. AVERAGE GLUCOSE BLD GHB EST-MCNC: 82 MG/DL (ref 68–126)
FASTING PATIENT LIPID ANSWER: NO
FASTING STATUS PATIENT QL REPORTED: NO
GFR SERPLBLD BASED ON 1.73 SQ M-ARVRAT: 82 ML/MIN/1.73M2 (ref 60–?)
GLOBULIN PLAS-MCNC: 4.1 G/DL (ref 2.8–4.4)
GLUCOSE BLD-MCNC: 98 MG/DL (ref 70–99)
HBA1C MFR BLD: 4.5 % (ref ?–5.7)
HDLC SERPL-MCNC: 47 MG/DL (ref 40–59)
LDLC SERPL CALC-MCNC: 125 MG/DL (ref ?–100)
NONHDLC SERPL-MCNC: 144 MG/DL (ref ?–130)
OSMOLALITY SERPL CALC.SUM OF ELEC: 296 MOSM/KG (ref 275–295)
POTASSIUM SERPL-SCNC: 4.1 MMOL/L (ref 3.5–5.1)
PROT SERPL-MCNC: 7.8 G/DL (ref 6.4–8.2)
SODIUM SERPL-SCNC: 143 MMOL/L (ref 136–145)
TRIGL SERPL-MCNC: 104 MG/DL (ref 30–149)
VLDLC SERPL CALC-MCNC: 18 MG/DL (ref 0–30)

## 2023-02-13 PROCEDURE — 36415 COLL VENOUS BLD VENIPUNCTURE: CPT | Performed by: FAMILY MEDICINE

## 2023-02-13 PROCEDURE — 3008F BODY MASS INDEX DOCD: CPT | Performed by: FAMILY MEDICINE

## 2023-02-13 PROCEDURE — 80053 COMPREHEN METABOLIC PANEL: CPT | Performed by: FAMILY MEDICINE

## 2023-02-13 PROCEDURE — 3079F DIAST BP 80-89 MM HG: CPT | Performed by: FAMILY MEDICINE

## 2023-02-13 PROCEDURE — 99385 PREV VISIT NEW AGE 18-39: CPT | Performed by: FAMILY MEDICINE

## 2023-02-13 PROCEDURE — 3075F SYST BP GE 130 - 139MM HG: CPT | Performed by: FAMILY MEDICINE

## 2023-02-13 PROCEDURE — 80061 LIPID PANEL: CPT | Performed by: FAMILY MEDICINE

## 2023-02-13 PROCEDURE — 83036 HEMOGLOBIN GLYCOSYLATED A1C: CPT | Performed by: FAMILY MEDICINE

## 2023-02-13 RX ORDER — ALBUTEROL SULFATE 90 UG/1
2 AEROSOL, METERED RESPIRATORY (INHALATION) EVERY 6 HOURS PRN
Qty: 25.5 G | Refills: 1 | Status: SHIPPED | OUTPATIENT
Start: 2023-02-13

## 2023-07-06 ENCOUNTER — HOSPITAL ENCOUNTER (OUTPATIENT)
Age: 28
Discharge: HOME | End: 2023-07-06
Payer: COMMERCIAL

## 2023-07-06 VITALS
RESPIRATION RATE: 18 BRPM | DIASTOLIC BLOOD PRESSURE: 82 MMHG | HEART RATE: 72 BPM | OXYGEN SATURATION: 100 % | SYSTOLIC BLOOD PRESSURE: 140 MMHG | TEMPERATURE: 98 F

## 2023-07-06 DIAGNOSIS — J06.9 UPPER RESPIRATORY TRACT INFECTION, UNSPECIFIED TYPE: Primary | ICD-10-CM

## 2023-07-06 DIAGNOSIS — R05.1 ACUTE COUGH: ICD-10-CM

## 2023-07-06 LAB
S PYO AG THROAT QL: NEGATIVE
SARS-COV-2 RNA RESP QL NAA+PROBE: NOT DETECTED

## 2023-07-06 PROCEDURE — 87880 STREP A ASSAY W/OPTIC: CPT | Performed by: EMERGENCY MEDICINE

## 2023-07-06 PROCEDURE — 99203 OFFICE O/P NEW LOW 30 MIN: CPT | Performed by: EMERGENCY MEDICINE

## 2023-07-06 PROCEDURE — U0002 COVID-19 LAB TEST NON-CDC: HCPCS | Performed by: EMERGENCY MEDICINE

## 2023-07-06 RX ORDER — BENZONATATE 100 MG/1
100 CAPSULE ORAL 3 TIMES DAILY PRN
Qty: 30 CAPSULE | Refills: 0 | Status: SHIPPED | OUTPATIENT
Start: 2023-07-06

## 2023-07-06 RX ORDER — ALBUTEROL SULFATE 90 UG/1
AEROSOL, METERED RESPIRATORY (INHALATION)
Qty: 1 EACH | Refills: 0 | Status: SHIPPED | OUTPATIENT
Start: 2023-07-06
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
## 2023-07-06 NOTE — DISCHARGE INSTRUCTIONS
Tessalon Perles cough medication, and albuterol sent to the pharmacy. Tessalon Perles can be taken with over-the-counter cough cold medications. As we discussed I recommended Mucinex.    Go to the ER for any new or worsening symptoms

## 2023-08-08 ENCOUNTER — TELEMEDICINE (OUTPATIENT)
Dept: NEUROLOGY | Facility: CLINIC | Age: 28
End: 2023-08-08
Payer: COMMERCIAL

## 2023-08-08 DIAGNOSIS — Z53.21 PATIENT LEFT WITHOUT BEING SEEN: Primary | ICD-10-CM

## 2023-08-10 ENCOUNTER — TELEMEDICINE (OUTPATIENT)
Dept: NEUROLOGY | Facility: CLINIC | Age: 28
End: 2023-08-10
Payer: COMMERCIAL

## 2023-08-10 DIAGNOSIS — G43.009 MIGRAINE WITHOUT AURA AND WITHOUT STATUS MIGRAINOSUS, NOT INTRACTABLE: ICD-10-CM

## 2023-08-10 DIAGNOSIS — G40.909 NONINTRACTABLE EPILEPSY WITHOUT STATUS EPILEPTICUS, UNSPECIFIED EPILEPSY TYPE (HCC): Primary | ICD-10-CM

## 2023-08-10 PROCEDURE — 99214 OFFICE O/P EST MOD 30 MIN: CPT | Performed by: OTHER

## 2023-08-10 RX ORDER — SUMATRIPTAN 100 MG/1
TABLET, FILM COATED ORAL
Qty: 9 TABLET | Refills: 11 | Status: SHIPPED | OUTPATIENT
Start: 2023-08-10

## 2023-08-10 RX ORDER — CLINDAMYCIN PHOSPHATE 10 UG/ML
60 LOTION TOPICAL DAILY
COMMUNITY
Start: 2023-06-13

## 2023-08-10 RX ORDER — LAMOTRIGINE 200 MG/1
200 TABLET ORAL DAILY
Qty: 90 TABLET | Refills: 3 | Status: SHIPPED | OUTPATIENT
Start: 2023-08-10 | End: 2024-08-09

## 2023-08-10 RX ORDER — LAMOTRIGINE 200 MG/1
200 TABLET ORAL
COMMUNITY
Start: 2023-07-14 | End: 2023-08-10

## 2023-08-10 NOTE — H&P
Neymar Dub [address]  [address] [facility], [address] [place] [address]  [phone]          I conducted a telehealth visit with Amy Curtis today, 08/10/23 which was completed using two-way, real-time interactive audio and video communication. This has been done in good christy to provide continuity of care in the best interest of the provider-patient relationship, due to the COVID -19 public health crisis/national emergency where restrictions of face-to-face office visits are ongoing. Every conscious effort was taken to allow for sufficient and adequate time to complete the visit. The patient was made aware of the limitations of the telehealth visit, including treatment limitations as no physical exam could be performed. The patient was advised to call 911 or to go to the ER in case there was an emergency. The patient was also advised of the potential privacy & security concerns related to the telehealth platform. The patient was made aware of where to find [facility] notice of privacy practices, telehealth consent form and other related consent forms and documents. which are located on the [facility] website. The patient verbally agreed to telehealth consent form, related consents and the risks discussed. Lastly, the patient confirmed that they were in [place]. Included in this visit, time may have been spent reviewing labs, medications, radiology tests and decision making. Appropriate medical decision-making and tests are ordered as detailed in the plan of care above. Coding/billing information is submitted for this visit based on complexity of care and/or time spent for the visit.     Neurology Clinic Follow Up Note    Chief Complaint:  Seizures and Headache      HPI:   Gege Daniel is a 25year old  w/ a pmhx  of a prior nocturnal seizure, asthma, and migraines  who presents for  follow-up after the patient had multiple seizures while on vacation in [place], requiring the outside hospital to intubate him. He was discharged on Keppra, and reports having significant diarrhea and cognitive symptoms as well as increased irritability. Given that it was unclear whether he has focal seizures that secondarily generalized or generalized epilepsy disorder he was started on Lamictal, which is a broad-spectrum antiepileptic. His Lamictal was uptitrated until it was therapeutic and that his Keppra was weaned. His last seizure was on 3/14/2021.    08/10/23 interval history/subjective:    No seizures. No Kirti Vu. Has it \"a few times a month. I will forget did I take my medicine, and then I will realize I did. \" He has short term memory deficits. Reports short term memory has been poor since his first seizure in 2018. Since the first seizure his short term memory has gotten worse overall. Short-term Memory:  -Repeats questions/statements Yes  -Has been misplacing items around the house Yes  -Has difficulty remembering details of recent conversations within a few hours Yes  -Has difficulty remembering names of familiar people family or friends Yes. He does not recall ppl's names.   -Is more reliant on calendars and reminders Yes  -Has missed some appointments or major events due to memory changes Yes  -These changes have been gradually progressive since onset Yes, Very minor. I notice it more. Maybe I am paying attention more. Behavioral/personality:  -Depression: No, better. He is in therapy. -Irritability: No     -Apathy: No    -Feels more anxious/worried: Not more than normal.  -Impatient, fidgety:No  -Less interested in hobbies or social activities: No  -Acts impulsively, disinhibited: No  -Increased or decreased appetite, weight change: No    -Increased or decreased sleep, daytime fatigue: No  -Change in personality: No     Last migraine was a few months ago. He missed work. Sigel nauseated. His head hurt.      The Migraine Disability Assessment Test (MIDAS)    Note: Select zero if  they did not have the activity in the last 3 months. On how many days in the last 3 months did you miss work or school because of your headaches?   1 days. How many days in the last 3 months was your productivity at work or school reduced by half or more because of your headaches? (Do not include days you counted in question 1 where you missed work or school.)  3 days. On how many days in the last 3 months did you not do household work (such as housework, home repairs and maintenance, shopping, caring for children and relatives) because of your headaches?  3 days. How many days in the last 3 months was your productivity in household work reduced by half of more because of your headaches? (Do not include days you counted in question 3 where you did not do household work.)  3 days. On how many days in the last 3 months did you miss family, social or leisure activities because of your headaches?  3 days. Total (Questions 1-5): 13              A.On how many days in the last 3 months did you have a headache? (If a headache lasted more than 1  day, count each day.)      B. On a scale of 0 - 10, on average how painful were these headaches? (where 0=no pain at all, and 10= pain as bad as it can be.)      Scoring:  After you have filled out this questionnaire, add the total number of days from questions 1-5 (ignore A and B). MIDAS Grade  Definition  MIDAS Score       I        Little or No Disability     0-5          II     Mild Disability     6-10          III     Moderate Disability     11-20          IV     Severe Disability     21+            He used to take 2 advils and they would go away. Now he needs 3 advils. 06/09/22 interval history/subjective:   No seizures since last clinic visit. He has been adherent with his Lamictal.  No missed doses. No adverse effects. No issues with his cognition or mood. Reviewed 48-hour ambulatory EEG; no ictal or interictal discharges.   Reviewed MRI from February .  No structural abnormalities. Discussed results of new FDA study that shows there may be a increased risk of arrhythmias in patients with heart disease. Explained that there was no need to change his antiepileptic at this time. He does not have a history of cardiac disease. He reports that he is having fewer migraines. 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 His last migraine was a year ago. Discussed lifestyle modifications for migraine. Discussed nutraceuticals As prophylactic agents. 21 interval history/subjective:    Lamictal level is finally therapeutic  Keppra level is subtherapeutic  (barely)  Depressive sx were transiently worse after the dose of his keppra was increased but now have improved. No sx concerning for interval seizures. Explained how sleep deprivation and alcohol can provoke a seizure in a pt with epilepsy but should not provoke seizures otherwise.     21 Interval history/subjective:  Seizure free since LCV  Lamictal level was 3.2 (therapeutic is 4.0)  Still having GI sx; not as frequent; happens q 2-3 weeks. 21 Interval history/subjective:  He got a new job in [employer]   MRI will be on 2021    His repeat Keppra level was still low at  7.0mcg/mL even after going up on the dose; No interval events or seizures  Mood is good  Still has intermittent diarrhea  Francesca Verónica is in 2 days  Cognition is okay  He has life stressors; anniversary of his friend's death. He was 1 mo younger;  at 22-20. He is exercising.      21 Interval History/Subjective:  Overall he is doing much better since his last clinic visit. He has less anxiety and concerned about being on antiepileptics. He has constipation now; taking probiotics. Mood swings improved. In a good mood for the past 3 weeks   He is an , and recently went to trial; it went well. No cognitive symptoms.     Reviewed his Keppra and Lamictal levels. Both were subtherapeutic. Patient reports that sometimes he misses the evening dose of Keppra. He says he does his best to be adherent. He states that he will forget his evening dose of explained videogames and falls asleep on his couch. He has not been driving. He uses [employer] or has his friend give him a ride. 05/11/21 Interval History/Subjective :   Review of outside records:  Records were sent from [facility] in [address]   patient was admitted on 3/14/2021 he was discharged on 3/20/2021. Patient had 3 seizures in route; when he arrived in the ER he did have a gag response. Therefore the ED attending intubated for airway protection. U tox was positive for amphetamines and? Cannabis    I discussed the results of the outside records. The patient had multiple family members present either via phone or in the room with him. His mom and dad were on the phone. Patient's sister was on the phone. There is another family member who is in the room. I asked him if he wanted me to discuss the results of his outside records with him alone or with family present. Patient stated he was okay with family being in the room. Explained to him that his U tox is positive for amphetamines and cannabis. Patient stated that his friends were smoking cannabis but he was not. Explained that U tox would not be positive if it was in his environment. Explained that it was unlikely that he had a false positive for amphetamines, but was unaware of the sensitivity and specificity of urine tox for cannabis. Regardless, there was no EEG report and no neurology note in the outside records. We had a lengthy discussion with multiple family members. The main point is that the patient had a seizure while asleep years ago. A first-time seizure that occurs during sleep is enough to begin someone on an antiepileptic drug. Patient did not understand why there was such a long time.  Between his first seizure and his most recent seizures. Explained that someone is untreated epilepsy, they will begin to have more seizures with him with increasing frequency. Explained that he is not allowed to drive until he is cleared by neurologist. Patient is very concerned about his cognitive function and the adverse effects of the antiepileptic drugs. He also reports significant depression on medication. He denies any SI. I explained to him of the potential harms of untreated epilepsy including sudden unexplained death in epilepsy and the associated loss of independence with the inability to drive long-term. Explained that cognitive side effects often improve as someone is on antiepileptics. I also explained that once his Lamictal level is therapeutic we can be weaned off his other antiepileptic. I also offered him a second opinion. I advised him I could refer him to multiple academic centers to be seen by epileptologist. Patient stated he did not need a second opinion because he likely would be given the same opinion. States that he is having a difficult time accepting the diagnosis. ROS: Pertinent positive and negatives per HPI. All others were reviewed and negative. Medications:    Current Outpatient Medications:     clindamycin 1 % External Lotion, Apply 60 mL topically daily. , Disp: , Rfl:     Econazole Nitrate 1 % External Cream, Apply topically. APPLY EXTERNALLY TO THE AFFECTED AREA TWICE DAILY FOR THE RASH ON THE CHEST FOR 3 WEEKS, Disp: , Rfl:     lamoTRIgine 200 MG Oral Tab, 1 tablet (200 mg total). , Disp: , Rfl:     tretinoin 0.025 % External Cream, Apply 45 g topically nightly. APPLY TO THE AFFECTED AREA EVERY NIGHT AT BEDTIME. REFER TO PREACTICE HANDOUT FOR SPECIFIC INSTRUCTIONS. FOR THE FACE AND STRETCH MARKS., Disp: , Rfl:     albuterol 108 (90 Base) MCG/ACT Inhalation Aero Soln, Inhale 1 puff and hold breath for 10 seconds then release. Wait 1 full minute and repeat for second puff. Use every 4-6 hours as needed. , Disp: 1 each, Rfl: 0    benzonatate 100 MG Oral Cap, Take 1 capsule (100 mg total) by mouth 3 (three) times daily as needed for cough. , Disp: 30 capsule, Rfl: 0    albuterol 108 (90 Base) MCG/ACT Inhalation Aero Soln, Inhale 2 puffs into the lungs every 6 (six) hours as needed for Wheezing., Disp: 25.5 g, Rfl: 1    Reviewed and assessed      Objective:  Last vitals and weight :       Exam:  - General: appears stated age and no distress  - Pulmonary: no signs of respiratory distress. Normal excursion of the chest.    Neurologic Exam  - Mental Status: Alert and attentive. Pleasant and cooperative. Fund of knowledge are excellent. [facility] Speech is spontaneous, fluent, and prosodic. Comprehension and repetition intact. Phrase length and rate are normal.   - Cranial Nerves: No gaze preference. Visual fields: Able to see the examiner's entire face. Pupils appear to be symmetric. They appear to be equally round and reactive when the patient opens and closes eyes. EOMI. No nystagmus. No ptosis. No pathological facial asymmetry. No flattening of the nasolabial fold. [facility] grossly intact. Tongue midline. Palate and uvula elevate symmetrically. Shoulder shrug symmetric.    - Motor:   normal bulk. Moving all 4 extremities spontaneously and symmetrically. No focal weakness. Asterixis: No asterixis noted. Tremor: None  - Sensory: Denies any deficits  Light touch: normal  - Cerebellum: No truncal ataxia. No titubations. No dysmetria, no dysdiadochokinesis. No overshoot. Most recent lab results:   Reviewed and assessed  No results found for this or any previous visit (from the past 36 hour(s)). 11/8/21 labs:  keppra level was 11.5    Lamictal level is 5.4    His lamictal level was 3.2 on 9/16/2021    His keppra level is 7.0mcg/mL from 7/2/2021    His Lamictal level is 1.6 MCG/ML. This is from June 7. His Keppra level is 4.7 MCG per mL. This is also from June 7.         Diagnostic studies:  Performed an independent visualization of no imaging to review  Imaging revealed: No imaging to review    Assessment     Santa Tellez is a [address] [facility] [facility]  who presents for follow-up for  epilepsy and migraine without aura. . 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 He had severe cognitive side effects, and the medication was stopped. While in [place] in 2021 he then had multiple seizures requiring intubation at an outside hospital. He was started on Keppra but has had significant diarrhea, and thus was started on lamotrigine. He had a 48-hour ambulatory EEG that was unremarkable. He had an MRI brain with and without contrast epilepsy protocol that was also unremarkable. He has been seizure-free. Goal was for him to be on 200 mg of lamotrigine twice daily. In November 2021 and his medication was uptitrated to 200 mg in the morning and 50 mg in the evening. However, when his medication  was renewed, it was only refilled at 200 mg daily. He has not had a level checked since 2021. He needs a trough level. If his level is low, either increase dose of lamotrigine or consider adding lacosamide and switching him off lamotrigine. He will keep a headache diary. If his headaches are increasing and he is having more migraine disability then consider preventative agent. Will send in a prescription for sumatriptan for rescue. Discussed lifestyle changes. He will increase his hydration to about 1 gallon of water a day or 103 ounces a day. Plan   1. Epilepsy  - ok to drive.  - LAMOTRIGINE (LAMICTAL), SERUM; Future  - lamoTRIgine 200 MG Oral Tab; Take 1 tablet (200 mg total) by mouth daily. Dispense: 90 tablet; Refill: 3    2. Migraine without aura and without status migrainosus, not intractable  - SUMAtriptan Succinate 100 MG Oral Tab; Use at onset; repeat once after 2 HRS-ONLY 2 IN 24 HR MAX. This is a 30 day supply. Dispense: 9 tablet;  Refill: 11  Diagnostics:  Imaging:None indicated. Sleep study: not indicated  Maintain a daily headache diary  Therapeutics:   Preventative: none. Lifestyle changes. An adequate trial of a preventative medication is 2 to 3 months at the target dose. Rescue/Acute Therapy: Sumatriptan    Limit <2 times per week to avoid developing medication overuse headaches. 3. Lifestyle modifications:  Recommend  a stable daily schedule as changes in a pt's daily schedule trigger HAs. This includes bedtime and wake up time, eating meals regularly, exercising, and maintaining a consistently low stress lifestyle. Exercise: moderate intensity aerobic exercise (150 minutes/week in 3-5 sessions). Practice good sleep hygiene. Maintain regular sleep cycles with bedtime and wake up times that do not differ significantly between weekdays and weekends. Maintain hydration: Visit [employer] to determine the individual water needs based on gender, weight, weather, and level of physical activity. Avoid factors that increase the risk of developing more frequent headaches including caffeine, obesity, certain sleep disorders, and medication overuse. To determine if caffeine avoidance will decrease HA burden pts must abstain for at least 2 to 3 months. If obese or overweight consider weight loss which will decrease the number of headache days. Simple analgesics are overused if taken on 15 or more days per month regardless of the reason. All other medications are overused when taken on 10 or more days per month. Avoid triggers. Education Provided  Education/Instructions given to: patient   Barriers to Learning: None  Content: Refer to note above   Evaluation/Outcome: Verbalized understanding    This document is not intended to support charting by exception. Sections left blank in a completed note should be presumed not to have been done. Disclaimer: This record was dictated using  [address]. There may be errors due to voice recognition problems that were not realized and corrected during the completion of the note. care on the above. Thank you for allowing me to participate in the care of your patient. Junius Gosselin   08/10/23    Total face to face time was 30 more than 50% of the time was spent in counseling and/or coordination of care related to epilepsy, seizure safety, migraines.

## 2023-08-11 NOTE — PROGRESS NOTES
Had to reschedule because this Yulissa Lapping was running behind and patient had another video visit. He is happy to be seen at a later date this week. Patient left without being seen by provider.

## 2023-08-14 ENCOUNTER — TELEPHONE (OUTPATIENT)
Dept: NEUROLOGY | Facility: CLINIC | Age: 28
End: 2023-08-14

## 2023-08-14 DIAGNOSIS — G40.909 NONINTRACTABLE EPILEPSY WITHOUT STATUS EPILEPTICUS, UNSPECIFIED EPILEPSY TYPE (HCC): Primary | ICD-10-CM

## 2023-08-14 DIAGNOSIS — G43.009 MIGRAINE WITHOUT AURA AND WITHOUT STATUS MIGRAINOSUS, NOT INTRACTABLE: ICD-10-CM

## 2023-08-14 NOTE — TELEPHONE ENCOUNTER
Received a call from patient requesting med's be send to  :[facility] [address], [facility] . Medication  was send to the incorrect pharmacy . SUMAtriptan Succinate 100 MG Oral Tab   lamoTRIgine 200 MG Oral Tab .     Please assist

## 2023-08-14 NOTE — TELEPHONE ENCOUNTER
Received a call from patient asking if labs needed to be done after of before taking the new medication  had prescribe .     Please advise

## 2023-08-15 RX ORDER — SUMATRIPTAN 100 MG/1
TABLET, FILM COATED ORAL
Qty: 9 TABLET | Refills: 11 | Status: SHIPPED | OUTPATIENT
Start: 2023-08-15

## 2023-08-15 RX ORDER — LAMOTRIGINE 200 MG/1
200 TABLET ORAL DAILY
Qty: 90 TABLET | Refills: 3 | Status: SHIPPED | OUTPATIENT
Start: 2023-08-15 | End: 2024-08-14

## 2023-08-18 ENCOUNTER — LAB ENCOUNTER (OUTPATIENT)
Dept: LAB | Facility: REFERENCE LAB | Age: 28
End: 2023-08-18
Attending: FAMILY MEDICINE
Payer: COMMERCIAL

## 2023-08-18 DIAGNOSIS — G40.909 NONINTRACTABLE EPILEPSY WITHOUT STATUS EPILEPTICUS, UNSPECIFIED EPILEPSY TYPE (HCC): ICD-10-CM

## 2023-08-18 PROCEDURE — 80175 DRUG SCREEN QUAN LAMOTRIGINE: CPT

## 2023-08-18 PROCEDURE — 36415 COLL VENOUS BLD VENIPUNCTURE: CPT

## 2023-08-21 LAB — LAMOTRIGINE LVL: 3.2 UG/ML

## 2023-11-29 ENCOUNTER — HOSPITAL ENCOUNTER (OUTPATIENT)
Age: 28
Discharge: HOME | End: 2023-11-29
Payer: COMMERCIAL

## 2023-11-29 ENCOUNTER — NURSE TRIAGE (OUTPATIENT)
Facility: CLINIC | Age: 28
End: 2023-11-29

## 2023-11-29 VITALS
DIASTOLIC BLOOD PRESSURE: 92 MMHG | SYSTOLIC BLOOD PRESSURE: 139 MMHG | RESPIRATION RATE: 20 BRPM | HEART RATE: 71 BPM | OXYGEN SATURATION: 100 % | TEMPERATURE: 99 F

## 2023-11-29 DIAGNOSIS — J06.9 VIRAL URI WITH COUGH: Primary | ICD-10-CM

## 2023-11-29 LAB — SARS-COV-2 RNA RESP QL NAA+PROBE: NOT DETECTED

## 2023-11-29 RX ORDER — ALBUTEROL SULFATE 90 UG/1
2 AEROSOL, METERED RESPIRATORY (INHALATION)
Qty: 1 EACH | Refills: 0 | Status: SHIPPED | OUTPATIENT
Start: 2023-11-29 | End: 2023-12-29

## 2023-11-29 NOTE — TELEPHONE ENCOUNTER
Action Requested: Summary for Provider     []  Critical Lab, Recommendations Needed  [] Need Additional Advice  []   FYI    []   Need Orders  [] Need Medications Sent to Pharmacy  []  Other     SUMMARY: Patient calling today with URI Symptoms. Patient does have asthma and has had some wheezing yesterday along with Cough and he feels he has fever. Taking Tylenol and josué seltzer cold and flu for symptoms. Also using inhalers to help keep airyway open. Was around 2 other family members with similar symptoms over the holiday. They tested negative for Covid although patient has not tested for covid. No Headache, CP and complaints of some slight wheezing yesterday, nothing today. No in clinic visits available today and since he has not tested agreeable to going to IC today for evaluation. Patient also reports runny nose, sore throat.         Reason for call: Cough  Onset: Saturday      Reason for Disposition   Wheezing is present    Protocols used: Cough-A-OH

## 2023-11-29 NOTE — ED INITIAL ASSESSMENT (HPI)
Pt came in due to cough, congestion, sinus pressure, and chills for the past 4 days. Pt stated he thinks he was exposed to something last week. Pt has easy non labored respirations.

## 2024-02-05 ENCOUNTER — OFFICE VISIT (OUTPATIENT)
Dept: FAMILY MEDICINE CLINIC | Facility: CLINIC | Age: 29
End: 2024-02-05
Payer: COMMERCIAL

## 2024-02-05 ENCOUNTER — HOSPITAL ENCOUNTER (OUTPATIENT)
Age: 29
Discharge: STILL ADMITTED OR ADMINISTRATIVE | End: 2024-02-05
Payer: COMMERCIAL

## 2024-02-05 ENCOUNTER — HOSPITAL ENCOUNTER (OUTPATIENT)
Dept: GENERAL RADIOLOGY | Age: 29
Discharge: HOME | End: 2024-02-05
Attending: PHYSICIAN ASSISTANT
Payer: COMMERCIAL

## 2024-02-05 VITALS
WEIGHT: 230 LBS | DIASTOLIC BLOOD PRESSURE: 80 MMHG | HEART RATE: 81 BPM | RESPIRATION RATE: 25 BRPM | BODY MASS INDEX: 34.86 KG/M2 | HEIGHT: 68 IN | SYSTOLIC BLOOD PRESSURE: 120 MMHG | OXYGEN SATURATION: 99 % | TEMPERATURE: 98 F

## 2024-02-05 DIAGNOSIS — R05.1 ACUTE COUGH: ICD-10-CM

## 2024-02-05 DIAGNOSIS — R05.1 ACUTE COUGH: Primary | ICD-10-CM

## 2024-02-05 DIAGNOSIS — J18.9 PNEUMONIA DUE TO INFECTIOUS ORGANISM, UNSPECIFIED LATERALITY, UNSPECIFIED PART OF LUNG: ICD-10-CM

## 2024-02-05 PROCEDURE — 3079F DIAST BP 80-89 MM HG: CPT | Performed by: PHYSICIAN ASSISTANT

## 2024-02-05 PROCEDURE — 3074F SYST BP LT 130 MM HG: CPT | Performed by: PHYSICIAN ASSISTANT

## 2024-02-05 PROCEDURE — 71046 X-RAY EXAM CHEST 2 VIEWS: CPT | Performed by: PHYSICIAN ASSISTANT

## 2024-02-05 PROCEDURE — 3008F BODY MASS INDEX DOCD: CPT | Performed by: PHYSICIAN ASSISTANT

## 2024-02-05 PROCEDURE — 99213 OFFICE O/P EST LOW 20 MIN: CPT | Performed by: PHYSICIAN ASSISTANT

## 2024-02-05 RX ORDER — AMOXICILLIN AND CLAVULANATE POTASSIUM 875; 125 MG/1; MG/1
1 TABLET, FILM COATED ORAL 2 TIMES DAILY
Qty: 20 TABLET | Refills: 0 | Status: SHIPPED | OUTPATIENT
Start: 2024-02-05 | End: 2024-02-15

## 2024-02-05 RX ORDER — AZITHROMYCIN 250 MG/1
TABLET, FILM COATED ORAL
Qty: 6 TABLET | Refills: 0 | Status: SHIPPED | OUTPATIENT
Start: 2024-02-05 | End: 2024-02-09

## 2024-02-05 RX ORDER — ALBUTEROL SULFATE 90 UG/1
2 AEROSOL, METERED RESPIRATORY (INHALATION) EVERY 6 HOURS PRN
Qty: 18 G | Refills: 0 | Status: SHIPPED | OUTPATIENT
Start: 2024-02-05

## 2024-03-21 PROBLEM — J06.9 VIRAL URI WITH COUGH: Status: RESOLVED | Noted: 2023-11-29 | Resolved: 2024-03-21

## 2024-04-23 ENCOUNTER — TELEPHONE (OUTPATIENT)
Dept: NEUROLOGY | Facility: CLINIC | Age: 29
End: 2024-04-23

## 2024-04-23 ENCOUNTER — PATIENT MESSAGE (OUTPATIENT)
Dept: NEUROLOGY | Facility: CLINIC | Age: 29
End: 2024-04-23

## 2024-04-23 NOTE — TELEPHONE ENCOUNTER
Pt called in advised his physiatrist needs a medical clearance from dr ramos to be treated with his adhd. Advised that after visit notes from dr ramos did not assist. Pls advise.

## 2024-04-23 NOTE — TELEPHONE ENCOUNTER
Detailed message has been left on the patients voicemail to contact the office.  Per the last [employer] message, the patient was advised that the clinical notes should be sufficient.  We will need further direction as to exactly what the patient is requesting.  Reviewed and electronically signed by: MING Kilgore

## 2024-04-24 NOTE — TELEPHONE ENCOUNTER
From: Alexander Reese  To: Wilberto Siegel  Sent: 4/23/2024 5:00 PM CDT  Subject: Medicine Approval    Dr. Robbin Quezada's office. I was recently evaluated for ADHD, and it did show that I have it. Before the Psychiatrist could begin medical treatment, she wanted medical clearance for me to take ADHD medication (Vyvanse.) She said you could call, fax, or email to confirm with her or if you had any questions. Her name is TANNER GRAHAM PA-C. Her contact information is below. Let me know if you have any questions, and feel free to give me a call.    [facility]  [email or web address]  [email or web address]  [phone] (office)  [phone](fax)

## 2024-06-18 ENCOUNTER — OFFICE VISIT (OUTPATIENT)
Dept: NEUROLOGY | Facility: CLINIC | Age: 29
End: 2024-06-18

## 2024-06-18 VITALS — BODY MASS INDEX: 34.86 KG/M2 | WEIGHT: 230 LBS | HEIGHT: 68 IN

## 2024-06-18 DIAGNOSIS — G43.009 MIGRAINE WITHOUT AURA AND WITHOUT STATUS MIGRAINOSUS, NOT INTRACTABLE: ICD-10-CM

## 2024-06-18 DIAGNOSIS — G40.909 NONINTRACTABLE EPILEPSY WITHOUT STATUS EPILEPTICUS, UNSPECIFIED EPILEPSY TYPE (HCC): Primary | ICD-10-CM

## 2024-06-18 PROCEDURE — 99215 OFFICE O/P EST HI 40 MIN: CPT | Performed by: OTHER

## 2024-06-18 PROCEDURE — 3008F BODY MASS INDEX DOCD: CPT | Performed by: OTHER

## 2024-06-18 RX ORDER — LISDEXAMFETAMINE DIMESYLATE 30 MG/1
30 CAPSULE ORAL DAILY
COMMUNITY
Start: 2024-06-11

## 2024-06-18 RX ORDER — LAMOTRIGINE 200 MG/1
200 TABLET ORAL DAILY
Qty: 90 TABLET | Refills: 3 | Status: SHIPPED | OUTPATIENT
Start: 2024-06-18 | End: 2025-06-18

## 2024-06-18 RX ORDER — SUMATRIPTAN 100 MG/1
TABLET, FILM COATED ORAL
Qty: 9 TABLET | Refills: 11 | Status: SHIPPED | OUTPATIENT
Start: 2024-06-18

## 2024-06-18 NOTE — PROGRESS NOTES
[facility] [address], [address]  [facility] [address]  [phone]          Neurology Clinic Follow Up Note    Chief Complaint:  Seizures (LOV 8/8/23 - The patient presents as a follow up for his seizures,. Pt states that he has been seizure free since last seen. Currently using Vimpat 200mg every day. Pt was also started on Vyvanse by his psychiatrist and states that he has been doing well.)      HPI:   Alexander Reese is a 28 year old  w/ a pmhx  of a prior nocturnal seizure, asthma, and migraines  who presents for  follow-up after the patient had multiple seizures while on vacation in [place], requiring the outside hospital to intubate him. He was discharged on Keppra, and reports having significant diarrhea and cognitive symptoms as well as increased irritability. Given that it was unclear whether he has focal seizures that secondarily generalized or generalized epilepsy disorder he was started on Lamictal, which is a broad-spectrum antiepileptic.    His Lamictal was uptitrated until it was therapeutic and that his Keppra was weaned.  His last seizure was on 3/14/2021.    06/18/24 interval history/subjective:  Seizure  free since last clinic visit.  He is doing well on his ADHD medications.   No side effects.     08/10/23 interval history/subjective:    No seizures.  No Kirti Vu. Has it \"a few times a month. I will forget did I take my medicine, and then I will realize I did.\" He has short term memory deficits. Reports short term memory has been poor since his first seizure in 2018.  Since the first seizure his short term memory has gotten worse overall.  Short-term Memory:  -Repeats questions/statements Yes  -Has been misplacing items around the house Yes  -Has difficulty remembering details of recent conversations within a few hours Yes  -Has difficulty remembering names of familiar people family or friends Yes. He does not recall ppl's names.   -Is more reliant on  calendars and reminders Yes  -Has missed some appointments or major events due to memory changes Yes  -These changes have been gradually progressive since onset Yes, Very minor. I notice it more. Maybe I am paying attention more.       Behavioral/personality:  -Depression: No, better. He is in therapy.  -Irritability: No     -Apathy: No    -Feels more anxious/worried: Not more than normal.  -Impatient, fidgety:No  -Less interested in hobbies or social activities: No  -Acts impulsively, disinhibited: No  -Increased or decreased appetite, weight change: No    -Increased or decreased sleep, daytime fatigue: No  -Change in personality: No     Last migraine was a few months ago.  He missed work. [place] nauseated. His head hurt.     The Migraine Disability Assessment Test (MIDAS)    Note: Select zero if  they did not have the activity in the last 3 months.     On how many days in the last 3 months did you miss work or school because of your headaches?   1 days.    How many days in the last 3 months was your productivity at work or school reduced by half or more because of your headaches? (Do not include days you counted in question 1 where you missed work or school.)  3 days.    On how many days in the last 3 months did you not do household work (such as housework, home repairs and maintenance, shopping, caring for children and relatives) because of your headaches?  3 days.     How many days in the last 3 months was your productivity in household work reduced by half of more because of your headaches? (Do not include days you counted in question 3 where you did not do household work.)  3 days.     On how many days in the last 3 months did you miss family, social or leisure activities because of your headaches?  3 days.      Total (Questions 1-5): 13              A.On how many days in the last 3 months did you have a headache? (If a headache lasted more than 1  day, count each day.)      B.On a scale of 0 - 10, on average  how painful were these headaches? (where 0=no pain at all, and 10= pain as bad as it can be.)      Scoring:  After you have filled out this questionnaire, add the total number of days from questions 1-5 (ignore A and B).      MIDAS Grade  Definition  MIDAS Score       I        Little or No Disability     0-5          II     Mild Disability     6-10          III     Moderate Disability     11-20          IV     Severe Disability     21+            He used to take 2 advils and they would go away. Now he needs 3 advils.    06/09/22 interval history/subjective:   No seizures since last clinic visit.  He has been adherent with his Lamictal.  No missed doses.  No adverse effects.  No issues with his cognition or mood.  Reviewed 48-hour ambulatory EEG; no ictal or interictal discharges.  Reviewed MRI from February 2022.  No structural abnormalities.  Discussed results of new FDA study that shows there may be a increased risk of arrhythmias in patients with heart disease.  Explained that there was no need to change his antiepileptic at this time.  He does not have a history of cardiac disease.    He reports that he is having fewer migraines.  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  His last migraine was a year ago.  Discussed lifestyle modifications for migraine.  Discussed nutraceuticals As prophylactic agents.    11/09/21 interval history/subjective:    Lamictal level is finally therapeutic  Keppra level is subtherapeutic  (barely)  Depressive sx were transiently worse after the dose of his keppra was increased but now have improved.  No sx concerning for interval seizures.  Explained how sleep deprivation and alcohol can provoke a seizure in a pt with epilepsy but should not provoke seizures otherwise.     09/21/21 Interval history/subjective:  Seizure free since [facility]  Lamictal level was 3.2 (therapeutic is 4.0)  Still having GI sx; not as frequent; happens q 2-3 weeks.        21 Interval history/subjective:  He got a new job in [place]   MRI will be on 2021    His repeat Keppra level was still low at  7.0mcg/mL even after going up on the dose;  No interval events or seizures  Mood is good  Still has intermittent diarrhea  Birthday is in 2 days  Cognition is okay  He has life stressors; anniversary of his friend's death. He was 1 mo younger;  at 24-25.  He is exercising.      21 Interval History/Subjective:  Overall he is doing much better since his last clinic visit.  He has less anxiety and concerned about being on antiepileptics.  He has constipation now; taking probiotics.  Mood swings improved.  In a good mood for the past 3 weeks   He is an , and recently went to trial; it went well.  No cognitive symptoms.    Reviewed his Keppra and Lamictal levels.  Both were subtherapeutic.  Patient reports that sometimes he misses the evening dose of Keppra.  He says he does his best to be adherent.  He states that he will forget his evening dose of explained videogames and falls asleep on his couch.  He has not been driving.  He uses Uber or has his friend give him a ride.    21 Interval History/Subjective :   Review of outside records:  Records were sent from [facility] in [facility]   patient was admitted on 3/14/2021 he was discharged on 3/20/2021.  Patient had 3 seizures in route; when he arrived in the ER he did have a gag response.  Therefore the ED attending intubated for airway protection.  U tox was positive for amphetamines and?  Cannabis    I discussed the results of the outside records. The patient had multiple family members present either via phone or in the room with him. His mom and dad were on the phone. Patient's sister was on the phone. There is another family member who is in the room. I asked him if he wanted me to discuss the results of his outside records with him alone or with family present. Patient stated he  was okay with family being in the room. Explained to him that his U tox is positive for amphetamines and cannabis. Patient stated that his friends were smoking cannabis but he was not. Explained that U tox would not be positive if it was in his environment. Explained that it was unlikely that he had a false positive for amphetamines, but was unaware of the sensitivity and specificity of urine tox for cannabis. Regardless, there was no EEG report and no neurology note in the outside records.    We had a lengthy discussion with multiple family members. The main point is that the patient had a seizure while asleep years ago. A first-time seizure that occurs during sleep is enough to begin someone on an antiepileptic drug. Patient did not understand why there was such a long time. Between his first seizure and his most recent seizures. Explained that someone is untreated epilepsy, they will begin to have more seizures with him with increasing frequency. Explained that he is not allowed to drive until he is cleared by neurologist. Patient is very concerned about his cognitive function and the adverse effects of the antiepileptic drugs. He also reports significant depression on medication. He denies any SI. I explained to him of the potential harms of untreated epilepsy including sudden unexplained death in epilepsy and the associated loss of independence with the inability to drive long-term. Explained that cognitive side effects often improve as someone is on antiepileptics. I also explained that once his Lamictal level is therapeutic we can be weaned off his other antiepileptic. I also offered him a second opinion. I advised him I could refer him to multiple academic centers to be seen by epileptologist. Patient stated he did not need a second opinion because he likely would be given the same opinion. States that he is having a difficult time accepting the diagnosis.    ROS: Pertinent positive and negatives per HPI.   All others were reviewed and negative.     Medications:    Current Outpatient Medications:     lisdexamfetamine 30 MG Oral Cap, Take 1 capsule (30 mg total) by mouth daily., Disp: , Rfl:     SUMAtriptan Succinate 100 MG Oral Tab, Use at onset; repeat once after 2 HRS-ONLY 2 IN 24 HR MAX.  This is a 30 day supply., Disp: 9 tablet, Rfl: 11    albuterol 108 (90 Base) MCG/ACT Inhalation Aero Soln, Inhale 2 puffs into the lungs every 6 (six) hours as needed for Wheezing., Disp: 18 g, Rfl: 0    lamoTRIgine 200 MG Oral Tab, Take 1 tablet (200 mg total) by mouth daily., Disp: 90 tablet, Rfl: 3    clindamycin 1 % External Lotion, Apply 60 mL topically daily., Disp: , Rfl:     Econazole Nitrate 1 % External Cream, Apply topically.  APPLY EXTERNALLY TO THE AFFECTED AREA TWICE DAILY FOR THE RASH ON THE CHEST FOR 3 WEEKS, Disp: , Rfl:     tretinoin 0.025 % External Cream, Apply 45 g topically nightly.  APPLY TO THE AFFECTED AREA EVERY NIGHT AT BEDTIME. REFER TO PREACTICE HANDOUT FOR SPECIFIC INSTRUCTIONS. FOR THE FACE AND STRETCH MARKS., Disp: , Rfl:     albuterol 108 (90 Base) MCG/ACT Inhalation Aero Soln, Inhale 1 puff and hold breath for 10 seconds then release.  Wait 1 full minute and repeat for second puff.  Use every 4-6 hours as needed., Disp: 1 each, Rfl: 0    benzonatate 100 MG Oral Cap, Take 1 capsule (100 mg total) by mouth 3 (three) times daily as needed for cough., Disp: 30 capsule, Rfl: 0    albuterol 108 (90 Base) MCG/ACT Inhalation Aero Soln, Inhale 2 puffs into the lungs every 6 (six) hours as needed for Wheezing., Disp: 25.5 g, Rfl: 1    Reviewed and assessed      Objective:  Last vitals and weight :       Exam:  - General: appears stated age and no distress  - Pulmonary: no signs of respiratory distress. Normal excursion of the chest.    Neurologic Exam  - Mental Status: Alert and attentive.  Pleasant and cooperative.  Fund of knowledge are excellent..  Speech is spontaneous, fluent, and prosodic.  Comprehension and repetition intact. Phrase length and rate are normal.   - Cranial Nerves: No gaze preference. Visual fields: Able to see the examiner's entire face.  Pupils appear to be symmetric.  They appear to be equally round and reactive when the patient opens and closes eyes.  EOMI. No nystagmus. No ptosis.No pathological facial asymmetry. No flattening of the nasolabial fold. .  Hearing grossly intact.  Tongue midline.Palate and uvula elevate symmetrically.  Shoulder shrug symmetric.    - Motor:   normal bulk.  Moving all 4 extremities spontaneously and symmetrically.  No focal weakness.      Asterixis: No asterixis noted.   Tremor: None  - Sensory: Denies any deficits  Light touch: normal  - Cerebellum: No truncal ataxia. No titubations. No dysmetria, no dysdiadochokinesis. No overshoot.        Most recent lab results:   Reviewed and assessed  No results found for this or any previous visit (from the past 36 hour(s)).    11/8/21 labs:  keppra level was 11.5    Lamictal level is 5.4    His lamictal level was 3.2 on 9/16/2021    His keppra level is 7.0mcg/mL from 7/2/2021    His Lamictal level is 1.6 MCG/ML.  This is from June 7.  His Keppra level is 4.7 MCG per mL.  This is also from June 7.        Diagnostic studies:  Performed an independent visualization of no imaging to review  Imaging revealed: No imaging to review    Assessment     Alexander Reese is a 28 year old  who presents for follow-up for  epilepsy and migraine without aura.. 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 He had severe cognitive side effects, and the medication was stopped. While in [place] in 2021 he then had multiple seizures requiring intubation at an outside hospital. He was started on Keppra but has had significant diarrhea, and thus was started on lamotrigine.  He had a 48-hour ambulatory EEG that was unremarkable.  He had an MRI brain with and without contrast  epilepsy protocol that was also unremarkable.  He has been seizure-free.      Goal was for him to be on 200 mg of lamotrigine twice daily.  In November 2021 and his medication was uptitrated to 200 mg in the morning and 50 mg in the evening.  However, when his medication  was renewed, it was only refilled at 200 mg daily.    He had a level checked on 8/18/23 and was 3.2. He has been seizure free on 200 mg. Although typically patients are on bid dosing, he has been seizure free on 200 mg, therefore will keep him on 200 mg daily.       He will keep a headache diary.  If his headaches are increasing and he is having more migraine disability then consider preventative agent.  Will send in a prescription for sumatriptan for rescue.  Discussed lifestyle changes.  He will increase his hydration to about 1 gallon of water a day or 103 ounces a day.       Plan   1.Epilepsy  - ok to drive.  - LAMOTRIGINE (LAMICTAL), SERUM; Future  - lamoTRIgine 200 MG Oral Tab; Take 1 tablet (200 mg total) by mouth daily.  Dispense: 90 tablet; Refill: 3  - okay for once daily dosing.     2. Migraine without aura and without status migrainosus, not intractable   Cont. Sumatriptan   Diagnostics:  Imaging:None indicated.    Sleep study: not indicated  Maintain a daily headache diary  Therapeutics:   Preventative: none. Lifestyle changes.   An adequate trial of a preventative medication is 2 to 3 months at the target dose.  Rescue/Acute Therapy: Sumatriptan    Limit <2 times per week to avoid developing medication overuse headaches.  3. Lifestyle modifications:  Recommend  a stable daily schedule as changes in a pt's daily schedule trigger HAs.  This includes bedtime and wake up time, eating meals regularly, exercising, and maintaining a consistently low stress lifestyle.  Exercise: moderate intensity aerobic exercise (150 minutes/week in 3-5 sessions).  Practice good sleep hygiene. Maintain regular sleep cycles with bedtime and wake up times  that do not differ significantly between weekdays and weekends.  Maintain hydration: Visit headache.[employer] to determine the individual water needs based on gender, weight, weather, and level of physical activity.  Avoid factors that increase the risk of developing more frequent headaches including caffeine, obesity, certain sleep disorders, and medication overuse.  To determine if caffeine avoidance will decrease HA burden pts must abstain for at least 2 to 3 months.  If obese or overweight consider weight loss which will decrease the number of headache days.  Simple analgesics are overused if taken on 15 or more days per month regardless of the reason.  All other medications are overused when taken on 10 or more days per month.  Avoid triggers.       Education Provided  Education/Instructions given to: patient   Barriers to Learning: None  Content: Refer to note above   Evaluation/Outcome: Verbalized understanding    This document is not intended to support charting by exception.  Sections left blank in a completed note should be presumed not to have been done.    Disclaimer:   This record was dictated using  Dragon software. There may be errors due to voice recognition problems that were not realized and corrected during the completion of the note.   care on the above.    Thank you for allowing me to participate in the care of your patient.    Wilberto Siegel  06/18/24

## 2025-02-03 NOTE — PROGRESS NOTES
Alexander Reese is a 29 year old male who presents for a complete physical exam.   HPI:     Hx of epilepsy. Sees Dr. Siegel.   Started vyvanse for ADHD in 06/2024 and has been helpful.     Wondering about weight loss medications. Has been more strict with diet over past 4-6 weeks. Has treadmill at home. [facility]. Exercises 3-4x/wk for 1 hour. No personal or FH of thyroid cancer or MEN syndrome.     Uses albuterol primarily with exercise.     Interested in testosterone level given weight gain and low energy.     Concerns: above  Last colonoscopy:  Never  Last PSA: Never  Diet/exercise: see above  Hx of STI screening: No hx or concerns  Substance abuse: None  Vaccines- Tdap: likely 2017, Flu: UTD , Covid: consider booster    REVIEW OF SYSTEMS:   GENERAL: feels well otherwise. See HPI  SKIN: denies any unusual skin lesions  EYES:denies vision change  HEENT: no hearing changes, negative  LUNGS: denies shortness of breath  CARDIOVASCULAR: denies chest pain on exertion  GI: denies abdominal pain, constipation or diarrhea. No hematochezia or melena  : denies nocturia or changes in stream  NEURO: denies headaches  PSYCHE: denies depression or anxiety    Current Outpatient Medications   Medication Sig Dispense Refill    albuterol 108 (90 Base) MCG/ACT Inhalation Aero Soln Inhale 2 puffs into the lungs every 6 (six) hours as needed for Wheezing. 2 each 2    semaglutide-weight management 0.25 MG/0.5ML Subcutaneous Solution Auto-injector Inject 0.5 mL (0.25 mg total) into the skin once a week for 4 doses. 2 mL 0    lisdexamfetamine 30 MG Oral Cap Take 1 capsule (30 mg total) by mouth daily.      lamoTRIgine 200 MG Oral Tab Take 1 tablet (200 mg total) by mouth daily. 90 tablet 3    SUMAtriptan Succinate 100 MG Oral Tab Use at onset; repeat once after 2 HRS-ONLY 2 IN 24 HR MAX.  This is a 30 day supply. 9 tablet 11    clindamycin 1 % External Lotion Apply 60 mL topically daily.      Econazole Nitrate 1 % External Cream Apply  topically.  APPLY EXTERNALLY TO THE AFFECTED AREA TWICE DAILY FOR THE RASH ON THE CHEST FOR 3 WEEKS      tretinoin 0.025 % External Cream Apply 45 g topically nightly.   APPLY TO THE AFFECTED AREA EVERY NIGHT AT BEDTIME. REFER TO PREACTICE HANDOUT FOR SPECIFIC INSTRUCTIONS. FOR THE FACE AND STRETCH MARKS.        Past Medical History:    Anxiety    Asthma (HCC)    Depression    Seizure disorder (HCC)    Seizures (HCC)      No past surgical history on file.   Family History   Problem Relation Age of Onset    Thyroid disease Mother         NOT cancer    Prostate Cancer Father 47    Diabetes Paternal Grandfather       Social History:  Social History     Socioeconomic History    Marital status: Single   Tobacco Use    Smoking status: Never    Smokeless tobacco: Never   Vaping Use    Vaping status: Never Used   Substance and Sexual Activity    Alcohol use: Yes     Comment: occ    Drug use: Yes     Types: Cannabis     Comment: occa   Other Topics Concern    Caffeine Concern No    Exercise Yes     Comment: gym daily           EXAM:     Wt Readings from Last 6 Encounters:   02/03/25 247 lb (112 kg)   06/18/24 230 lb (104.3 kg)   02/05/24 230 lb (104.3 kg)   02/13/23 231 lb (104.8 kg)   11/09/21 220 lb (99.8 kg)   09/21/21 220 lb (99.8 kg)     Body mass index is 37.56 kg/m².    /84   Pulse 66   Ht 5' 8\" (1.727 m)   Wt 247 lb (112 kg)   SpO2 99%   BMI 37.56 kg/m²      GENERAL: well developed, well nourished, in no apparent distress  SKIN: no rashes, no suspicious lesions  HEENT: atraumatic, normocephalic, MMM, nose normal, Tms & EACs normal  EYES: PERRLA, EOMI, no conjunctival injection  NECK: supple, no adenopathy   LUNGS: CTA b/l, no w/r/r  CARDIO: RRR without murmur  GI: normoactive bowel sounds, NT/ND, no masses, no HSM  EXTREMITIES: no cyanosis, clubbing or edema  NEURO: Alert & oriented, motor and sensory are grossly intact    Cholesterol, Total (mg/dL)   Date Value   02/13/2023 191     HDL Cholesterol (mg/dL)    Date Value   02/13/2023 47     LDL Cholesterol (mg/dL)   Date Value   02/13/2023 125 (H)     AST (U/L)   Date Value   02/13/2023 21     ALT (U/L)   Date Value   02/13/2023 26      ASSESSMENT AND PLAN:   Alexander Reese is a 29 year old male who presents for a complete physical exam.    Encounter Diagnoses   Name Primary?    Physical exam, annual Yes    Class 2 obesity without serious comorbidity with body mass index (BMI) of 37.0 to 37.9 in adult, unspecified obesity type     Weight gain     Mild intermittent asthma without complication (HCC)      Orders Placed This Encounter   Procedures    Hemoglobin A1C    CBC With Differential With Platelet    Comp Metabolic Panel (14)    TSH W Reflex To Free T4    Lipid Panel    HCV Antibody    Testosterone Total [E]       -Obesity: Continue lifestyle changes. F/u labs. Will trial Wegovy. SE profile reviewed. Plan to send [employer] message monthly with update and will increase dose monthly until maintenance dose.   -Weight gain: F/u labs. Plan as above. Testosterone ordered per request.   -Asthma: Well-controlled. Continue albuterol.   -Annual labs ordered.     Discussed with patient the following:  -Risks and benefits of screening for prostate cancer:  -Colon cancer screening   -Healthy diet including adequate intake of vegetables and fruits, appropriate portion sizes, minimizing highly concentrated carbohydrate foods  -Exercising 30 minutes a day most days of the week   -Importance of regular exercise and weight maintenance/loss   -Diabetes screening   -Cholesterol screening   -Recommendation for yearly influenza vaccine  -Need for Tdap once as an adult and Td booster every 10 years  -Need for Zoster vaccine for patients >= 50     Meds & Refills for this Visit:  Requested Prescriptions     Signed Prescriptions Disp Refills    albuterol 108 (90 Base) MCG/ACT Inhalation Aero Soln 2 each 2     Sig: Inhale 2 puffs into the lungs every 6 (six) hours as needed for Wheezing.     semaglutide-weight management 0.25 MG/0.5ML Subcutaneous Solution Auto-injector 2 mL 0     Sig: Inject 0.5 mL (0.25 mg total) into the skin once a week for 4 doses.       Imaging & Consults:  None    Return in 1 year for annual physical or sooner as needed.     Faraz Suresh,   02/03/25   2:04 PM

## 2025-02-03 NOTE — TELEPHONE ENCOUNTER
Noted last appt with Dr Suresh was 02/13/2023-Annual physical    Future Appointments   Date Time Provider Department Center   2/3/2025  2:00 PM Faraz Suresh,  [facility]

## 2025-02-05 NOTE — TELEPHONE ENCOUNTER
Approved  Prior authorization approved  Payer: [facility] Case ID: [id number]    [phone]  Note from payer: Your request was approved based on the initial information provided at the time of the coverage request submission. Please allow additional time for the final decision to be made and added to the patient's account.

## 2025-02-06 NOTE — TELEPHONE ENCOUNTER
Patient had an office visit on 2/3/25 with Dr. Suresh, his notes state: Plan to send mychart message monthly with update and will increase dose monthly until maintenance dose.     MyChart message sent to patient in response to [employer] message received--->see message.

## 2025-03-03 NOTE — TELEPHONE ENCOUNTER
The patient called to give an update on how he is feeling after one month of wegovy. he stated there are no side effects, he stated he lost about 6 lbs but he is unsure. He states he has not had any nausea vomiting or abdominal pain or diarrhea. His appetite is suppressed. He is ok with increasing his dose if that is what it recommended but he does need a refill of the medication weather the next dose or the same dose he was just on.  please advise - script pended for the next dose up

## 2025-03-20 NOTE — PROGRESS NOTES
CHIEF COMPLAINT:     Chief Complaint   Patient presents with    Cough     I’ve had a cough for over a week and it’s getting a little worse - Entered by patient     HPI:   Alexander Reese is a 28 year old male who presents for cough for over a week with worsening symptoms. Has had a fever last week. No fever in the past 3 days.  Cough is at times productive of yellow sputum and is worse at night. Cough is interfering with sleep.      Associated symptoms include:  no sore throat, no sinus congestion, + shortness of breath with exertion, occasional wheezing, no post-tussive emesis/inspiratory whoop.    Home treatments include: Robitussin with no improvement, cough drops, tylenol.  no hx of bronchitis.  no hx of asthma  + hx of PNA, 1-2 years ago    Current Outpatient Medications   Medication Sig Dispense Refill    amoxicillin clavulanate 875-125 MG Oral Tab Take 1 tablet by mouth 2 (two) times daily for 10 days. 20 tablet 0    azithromycin 250 MG Oral Tab Take 2 tablets (500 mg total) by mouth daily for 1 day, THEN 1 tablet (250 mg total) daily for 4 days. 6 tablet 0    albuterol 108 (90 Base) MCG/ACT Inhalation Aero Soln Inhale 2 puffs into the lungs every 6 (six) hours as needed for Wheezing. 18 g 0    lamoTRIgine 200 MG Oral Tab Take 1 tablet (200 mg total) by mouth daily. 90 tablet 3    SUMAtriptan Succinate 100 MG Oral Tab Use at onset; repeat once after 2 HRS-ONLY 2 IN 24 HR MAX.  This is a 30 day supply. 9 tablet 11    clindamycin 1 % External Lotion Apply 60 mL topically daily.      Econazole Nitrate 1 % External Cream Apply topically.  APPLY EXTERNALLY TO THE AFFECTED AREA TWICE DAILY FOR THE RASH ON THE CHEST FOR 3 WEEKS      tretinoin 0.025 % External Cream Apply 45 g topically nightly.   APPLY TO THE AFFECTED AREA EVERY NIGHT AT BEDTIME. REFER TO PREACTICE HANDOUT FOR SPECIFIC INSTRUCTIONS. FOR THE FACE AND STRETCH MARKS.      albuterol 108 (90 Base) MCG/ACT Inhalation Aero Soln Inhale 1 puff and hold  breath for 10 seconds then release.  Wait 1 full minute and repeat for second puff.  Use every 4-6 hours as needed. 1 each 0    benzonatate 100 MG Oral Cap Take 1 capsule (100 mg total) by mouth 3 (three) times daily as needed for cough. 30 capsule 0    albuterol 108 (90 Base) MCG/ACT Inhalation Aero Soln Inhale 2 puffs into the lungs every 6 (six) hours as needed for Wheezing. 25.5 g 1      Past Medical History:   Diagnosis Date    Anxiety     Asthma     Depression     Seizure disorder (HCC)     Seizures (HCC)       Social History:  Social History     Socioeconomic History    Marital status: Single   Tobacco Use    Smoking status: Never    Smokeless tobacco: Never   Vaping Use    Vaping Use: Never used   Substance and Sexual Activity    Alcohol use: Yes     Comment: occ    Drug use: Yes     Types: Cannabis     Comment: occa   Other Topics Concern    Caffeine Concern No    Exercise Yes     Comment: gym daily        REVIEW OF SYSTEMS:   GENERAL: See HPI, ok appetite, +fatigue   SKIN: No rashes, or other skin lesions.   EYES: Denies change in vision.  HENT: Denies ear pain or decreased hearing.  See HPI  CARDIOVASCULAR: Denies chest pain or palpitations  LUNGS: Per HPI.    GI: Denies N/V/C/D or abdominal pain.  NEURO; no headaches dizziness or lightheadedness  EXAM:   /80   Pulse 81   Temp 98.4 °F (36.9 °C) (Tympanic)   Resp 25   Ht 5' 8\" (1.727 m)   Wt 230 lb (104.3 kg)   SpO2 99%   BMI 34.97 kg/m²   GENERAL: well developed, well nourished, in no apparent distress  SKIN: no rashes, no suspicious lesions  EYES: Conjunctiva clear.    HENT: Atraumatic, normocephalic.  TM's clear bilaterally.  Nostrils patent, nasal mucosa pink and non-inflamed.  No erythema of the throat.  NECK: supple, non-tender.  LUNGS: Increased respiratory rate. Slightly increased effort.  Dry cough. No wheezing. No rales or rhonchi.  No decreased BS noted.   CARDIO: RRR without murmur  LYMPH: No cervical or supraclavicular  lymphadenopathy.   EXTREMITIES:  No clubbing, cyanosis, or edema.    ASSESSMENT AND PLAN:   Alexander Reese is a 28 year old male who presents with:     ASSESSMENT:  Encounter Diagnoses   Name Primary?    Acute cough Yes    Pneumonia due to infectious organism, unspecified laterality, unspecified part of lung        PLAN:   With worsening symptoms will order CXR to r/o PNA. Results: Pulmonary opacity occupying majority the middle lobe concerning for lobar pneumonia. Discussed results with pt.  Reviewed meds and instructions as below with patient.  Risks, benefits, and side effects of medication explained and discussed.   Comfort measures discussed.   To follow-up with PCP if no improvement in 3-5 days or to Emergency Room sooner for new or worsening sx.   Patient verbalized understanding and is agreeable to treatment plan.     Requested Prescriptions     Signed Prescriptions Disp Refills    amoxicillin clavulanate 875-125 MG Oral Tab 20 tablet 0     Sig: Take 1 tablet by mouth 2 (two) times daily for 10 days.    azithromycin 250 MG Oral Tab 6 tablet 0     Sig: Take 2 tablets (500 mg total) by mouth daily for 1 day, THEN 1 tablet (250 mg total) daily for 4 days.    albuterol 108 (90 Base) MCG/ACT Inhalation Aero Soln 18 g 0     Sig: Inhale 2 puffs into the lungs every 6 (six) hours as needed for Wheezing.         There are no Patient Instructions on file for this visit.       The patient is a 22y Female complaining of vomiting.

## 2025-03-28 NOTE — TELEPHONE ENCOUNTER
Which GLP is the patient on: Wegovy  Current dose: 0.5 mg  Patient seeking refill or increase to next dose: yes  How many doses of current dose completed: 4  Side effects, if any: none  Current weight / how much weight loss: 7 lb  Last visit: 02/03/2025

## 2025-04-12 NOTE — ED PROVIDER NOTES
Patient Seen in: [facility]    History     Chief Complaint   Patient presents with    Sinusitis     Stated Complaint: Cold - Sinus pressure, runny nose, and fatigue    Subjective:   Well-appearing 29-year-old male with no significant past medical history presents with complaints of nasal congestion, fatigue, chills, sinus pain and pressure and right ear pain since this past Wednesday.  Patient communicates that he also felt feverish yesterday.  Patient communicates that he has taken Sudafed PE for symptoms with little relief.  No measured temps at home.  Patient denies chest pain or shortness of breath.  Patient denies cough.              Objective:     Past Medical History:    Anxiety    Asthma (HCC)    Depression    Seizure disorder (HCC)    Seizures (HCC)              History reviewed. No pertinent surgical history.             Social History     Socioeconomic History    Marital status: Single   Tobacco Use    Smoking status: Never    Smokeless tobacco: Never   Vaping Use    Vaping status: Never Used   Substance and Sexual Activity    Alcohol use: Yes     Comment: occ    Drug use: Yes     Types: Cannabis     Comment: occa   Other Topics Concern    Caffeine Concern No    Exercise Yes     Comment: gym daily              Review of Systems    Positive for stated complaint: Cold - Sinus pressure, runny nose, and fatigue  Other systems are as noted in HPI.  Constitutional and vital signs reviewed.      All other systems reviewed and negative except as noted above.    Physical Exam     ED Triage Vitals [04/12/25 1144]   /74   Pulse 58   Resp 20   Temp 98.5 °F (36.9 °C)   Temp src Oral   SpO2 100 %   O2 Device None (Room air)       Current Vitals:   Vital Signs  BP: 142/74  Pulse: 58  Resp: 20  Temp: 98.5 °F (36.9 °C)  Temp src: Oral    Oxygen Therapy  SpO2: 100 %  O2 Device: None (Room air)        Physical Exam  VS: Vital signs reviewed. 02 saturation within normal limits for this  patient.    General: Patient is awake and alert, oriented to person, place and time. Pt appears non-toxic.     HEENT: Head is normocephalic, atraumatic. Nonicteric sclera, no conjunctival injection. No facial droop or slurred speech. No oral lesions or pallor. Mucous membranes moist.     Right Ear: Ear canal and external ear normal. A middle ear effusion is present.      Left Ear: Ear canal and external ear normal. A middle ear effusion is present.      Nose: Congestion present. No rhinorrhea.      Right Sinus: Maxillary sinus tenderness present.      Left Sinus: Maxillary sinus tenderness present.      Mouth/Throat:      Lips: Pink.      Mouth: Mucous membranes are moist.      Pharynx: Oropharynx is clear.     Neck: Supple. Normal ROM.    Lungs: Good inspiratory effort. No accessory muscle use or tachypnea.    Extremities: No focal swelling or tenderness. Capillary refill noted.     Skin: Warm, dry and normal in color.     Psychiatric: Normal affect, judgement normal, insight normal.     CNS: Moves all 4 extremities. Interacts appropriately. No gait abnormality. Memory normal.        ED Course     Labs Reviewed   POCT FLU TEST - Normal    Narrative:     This assay is a rapid molecular in vitro test utilizing nucleic acid amplification of influenza A and B viral RNA.   RAPID SARS-COV-2 BY PCR - Normal        MDM   Medical Decision Making  Well-appearing.  Influenza negative.  Rapid COVID-19 PCR not detected.  I discussed continuing over-the-counter cough and cold medications as well as Sudafed PE.  Caution with overlapping medications.  Hydration and rest.  Differential diagnosis considered included rhinosinusitis versus sinusitis versus viral upper respiratory tract infection versus COVID-19 versus influenza.  PMD follow-up.  Return precautions discussed.      Problems Addressed:  Encounter for laboratory testing for COVID-19 virus: acute illness or injury  Lab test negative for COVID-19 virus: acute illness or  injury  Viral upper respiratory tract infection: acute illness or injury    Amount and/or Complexity of Data Reviewed  Labs: ordered. Decision-making details documented in ED Course.    Risk  OTC drugs.        Disposition and Plan     Clinical Impression:  1. Viral upper respiratory tract infection    2. Encounter for laboratory testing for COVID-19 virus    3. Lab test negative for COVID-19 virus         Disposition:  Discharge  4/12/2025 12:10 pm    Follow-up:  Stefan Mccain MD  [address] [place] [address]  [phone]    In 1 week  As needed          Medications Prescribed:  Discharge Medication List as of 4/12/2025 12:11 PM          Supplementary Documentation:

## 2025-04-12 NOTE — ED INITIAL ASSESSMENT (HPI)
Pt complaining of sinus pressure since Wednesday with right ear pain, states it feels like he's under water. Pt reports fevers, chills and fatigue. No medications taken today.

## 2025-05-28 NOTE — TELEPHONE ENCOUNTER
Dr Suresh,    Patient calling (verified full name and date of birth).  Asking for Wegovy refill as he is out of his prescription  Current dose is 1.7 mgs per patient  Pended refill to new pharmacy per patient request below for your review

## 2025-06-09 NOTE — PROGRESS NOTES
The following individual(s) verbally consented to be recorded using ambient AI listening technology and understand that they can each withdraw their consent to this listening technology at any point by asking the clinician to turn off or pause the recording:    Patient name: Alexander Valladaresmon

## 2025-06-09 NOTE — PROGRESS NOTES
[facility] [address], [address]  [facility] [address]  [phone]          Neurology Clinic Follow Up Note    Chief Complaint:  Seizures (LOV 06/18/24 pt presents today with seizures and is taking Lamotrigine 200 mg. Pt states that medication is helping. (Pt consent to asael))      HPI:   Alexander Reese is a 29 year old  w/ a pmhx  of a prior nocturnal seizure, asthma, and migraines  who presents for  follow-up after the patient had multiple seizures while on vacation in [place], requiring the outside hospital to intubate him. He was discharged on Keppra, and reports having significant diarrhea and cognitive symptoms as well as increased irritability. Given that it was unclear whether he has focal seizures that secondarily generalized or generalized epilepsy disorder he was started on Lamictal, which is a broad-spectrum antiepileptic.    His Lamictal was uptitrated until it was therapeutic and that his Keppra was weaned.  His last seizure was on 3/14/2021.    06/09/25 Interval history/subjective;  Seizure  free since last clinic visit.  No adverse effects.  No cognitive symptoms.  He is doing well on his ADHD medications.   No side effects.   Migraines are manageable  History of Present Illness  Alexander Reese is a 29 year old male with a history of seizures who presents for a neurology follow-up.    He has been seizure-free since his last visit. He has a history of migraines, which occur occasionally and are managed with sumatriptan and over-the-counter medications like Advil. No additional medication is required for his headaches at this time.    He is currently taking lisdexamfetamine and propranolol for anxiety. Propranolol is used occasionally, particularly before hearings or trials, and it helps with his anxiety. He uses his albuterol inhaler primarily before exercise, and no recent asthma attacks have occurred.    He has been using Wegovy since the beginning  of the year and has lost 24 pounds. He experiences occasional nausea but no vomiting. His appetite has decreased significantly, leading to reduced fast food consumption and reliance on healthier options like fruits and eggs. He feels full more quickly and has not needed to use food delivery services as frequently.    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 No cognitive issues, mood disturbances, or side effects from current medications.    .     06/18/24 interval history/subjective:  Seizure  free since last clinic visit.  He is doing well on his ADHD medications.   No side effects.     08/10/23 interval history/subjective:    No seizures.  No Kirti Vu. Has it \"a few times a month. I will forget did I take my medicine, and then I will realize I did.\" He has short term memory deficits. Reports short term memory has been poor since his first seizure in 2018.  Since the first seizure his short term memory has gotten worse overall.  Short-term Memory:  -Repeats questions/statements Yes  -Has been misplacing items around the house Yes  -Has difficulty remembering details of recent conversations within a few hours Yes  -Has difficulty remembering names of familiar people family or friends Yes. He does not recall ppl's names.   -Is more reliant on calendars and reminders Yes  -Has missed some appointments or major events due to memory changes Yes  -These changes have been gradually progressive since onset Yes, Very minor. I notice it more. Maybe I am paying attention more.       Behavioral/personality:  -Depression: No, better. He is in therapy.  -Irritability: No     -Apathy: No    -Feels more anxious/worried: Not more than normal.  -Impatient, fidgety:No  -Less interested in hobbies or social activities: No  -Acts impulsively, disinhibited: No  -Increased or decreased appetite, weight change: No    -Increased or decreased sleep, daytime fatigue:  No  -Change in personality: No     Last migraine was a few months ago.  He missed work. [place] nauseated. His head hurt.     The Migraine Disability Assessment Test (MIDAS)    Note: Select zero if  they did not have the activity in the last 3 months.     On how many days in the last 3 months did you miss work or school because of your headaches?   1 days.    How many days in the last 3 months was your productivity at work or school reduced by half or more because of your headaches? (Do not include days you counted in question 1 where you missed work or school.)  3 days.    On how many days in the last 3 months did you not do household work (such as housework, home repairs and maintenance, shopping, caring for children and relatives) because of your headaches?  3 days.     How many days in the last 3 months was your productivity in household work reduced by half of more because of your headaches? (Do not include days you counted in question 3 where you did not do household work.)  3 days.     On how many days in the last 3 months did you miss family, social or leisure activities because of your headaches?  3 days.      Total (Questions 1-5): 13              A.On how many days in the last 3 months did you have a headache? (If a headache lasted more than 1  day, count each day.)      B.On a scale of 0 - 10, on average how painful were these headaches? (where 0=no pain at all, and 10= pain as bad as it can be.)      Scoring:  After you have filled out this questionnaire, add the total number of days from questions 1-5 (ignore A and B).      MIDAS Grade  Definition  MIDAS Score       I        Little or No Disability     0-5          II     Mild Disability     6-10          III     Moderate Disability     11-20          IV     Severe Disability     21+            He used to take 2 advils and they would go away. Now he needs 3 advils.    06/09/22 interval history/subjective:   No seizures since last clinic visit.  He has  been adherent with his Lamictal.  No missed doses.  No adverse effects.  No issues with his cognition or mood.  Reviewed 48-hour ambulatory EEG; no ictal or interictal discharges.  Reviewed MRI from 2022.  No structural abnormalities.  Discussed results of new FDA study that shows there may be a increased risk of arrhythmias in patients with heart disease.  Explained that there was no need to change his antiepileptic at this time.  He does not have a history of cardiac disease.    He reports that he is having fewer migraines.  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  His last migraine was a year ago.  Discussed lifestyle modifications for migraine.  Discussed nutraceuticals As prophylactic agents.    21 interval history/subjective:    Lamictal level is finally therapeutic  Keppra level is subtherapeutic  (barely)  Depressive sx were transiently worse after the dose of his keppra was increased but now have improved.  No sx concerning for interval seizures.  Explained how sleep deprivation and alcohol can provoke a seizure in a pt with epilepsy but should not provoke seizures otherwise.     21 Interval history/subjective:  Seizure free since LCV  Lamictal level was 3.2 (therapeutic is 4.0)  Still having GI sx; not as frequent; happens q 2-3 weeks.       21 Interval history/subjective:  He got a new job in [employer]   MRI will be on 2021    His repeat Keppra level was still low at  7.0mcg/mL even after going up on the dose;  No interval events or seizures  Mood is good  Still has intermittent diarrhea  Birthday is in 2 days  Cognition is okay  He has life stressors; anniversary of his friend's death. He was 1 mo younger;  at 24-25.  He is exercising.      21 Interval History/Subjective:  Overall he is doing much better since his last clinic visit.  He has less anxiety and concerned about being on antiepileptics.  He has constipation  now; taking probiotics.  Mood swings improved.  In a good mood for the past 3 weeks   He is an , and recently went to trial; it went well.  No cognitive symptoms.    Reviewed his Keppra and Lamictal levels.  Both were subtherapeutic.  Patient reports that sometimes he misses the evening dose of Keppra.  He says he does his best to be adherent.  He states that he will forget his evening dose of explained videogames and falls asleep on his couch.  He has not been driving.  He uses Uber or has his friend give him a ride.    05/11/21 Interval History/Subjective :   Review of outside records:  Records were sent from [facility] in [facility]   patient was admitted on 3/14/2021 he was discharged on 3/20/2021.  Patient had 3 seizures in route; when he arrived in the ER he did have a gag response.  Therefore the ED attending intubated for airway protection.  U tox was positive for amphetamines and?  Cannabis    I discussed the results of the outside records. The patient had multiple family members present either via phone or in the room with him. His mom and dad were on the phone. Patient's sister was on the phone. There is another family member who is in the room. I asked him if he wanted me to discuss the results of his outside records with him alone or with family present. Patient stated he was okay with family being in the room. Explained to him that his U tox is positive for amphetamines and cannabis. Patient stated that his friends were smoking cannabis but he was not. Explained that U tox would not be positive if it was in his environment. Explained that it was unlikely that he had a false positive for amphetamines, but was unaware of the sensitivity and specificity of urine tox for cannabis. Regardless, there was no EEG report and no neurology note in the outside records.    We had a lengthy discussion with multiple family members. The main point is that the patient had a seizure while  asleep years ago. A first-time seizure that occurs during sleep is enough to begin someone on an antiepileptic drug. Patient did not understand why there was such a long time. Between his first seizure and his most recent seizures. Explained that someone is untreated epilepsy, they will begin to have more seizures with him with increasing frequency. Explained that he is not allowed to drive until he is cleared by neurologist. Patient is very concerned about his cognitive function and the adverse effects of the antiepileptic drugs. He also reports significant depression on medication. He denies any SI. I explained to him of the potential harms of untreated epilepsy including sudden unexplained death in epilepsy and the associated loss of independence with the inability to drive long-term. Explained that cognitive side effects often improve as someone is on antiepileptics. I also explained that once his Lamictal level is therapeutic we can be weaned off his other antiepileptic. I also offered him a second opinion. I advised him I could refer him to multiple academic centers to be seen by epileptologist. Patient stated he did not need a second opinion because he likely would be given the same opinion. States that he is having a difficult time accepting the diagnosis.    ROS: Pertinent positive and negatives per HPI.  All others were reviewed and negative.     Medications:    Current Outpatient Medications:     semaglutide-weight management 1.7 MG/0.75ML Subcutaneous Solution Auto-injector, Inject 0.75 mL (1.7 mg total) into the skin once a week for 4 doses., Disp: 3 mL, Rfl: 0    albuterol 108 (90 Base) MCG/ACT Inhalation Aero Soln, Inhale 2 puffs into the lungs every 6 (six) hours as needed for Wheezing., Disp: 2 each, Rfl: 2    lisdexamfetamine 30 MG Oral Cap, Take 1 capsule (30 mg total) by mouth daily., Disp: , Rfl:     lamoTRIgine 200 MG Oral Tab, Take 1 tablet (200 mg total) by mouth daily., Disp: 90 tablet, Rfl:  3    SUMAtriptan Succinate 100 MG Oral Tab, Use at onset; repeat once after 2 HRS-ONLY 2 IN 24 HR MAX.  This is a 30 day supply., Disp: 9 tablet, Rfl: 11    clindamycin 1 % External Lotion, Apply 60 mL topically daily., Disp: , Rfl:     Econazole Nitrate 1 % External Cream, Apply topically.  APPLY EXTERNALLY TO THE AFFECTED AREA TWICE DAILY FOR THE RASH ON THE CHEST FOR 3 WEEKS, Disp: , Rfl:     tretinoin 0.025 % External Cream, Apply 45 g topically nightly.  APPLY TO THE AFFECTED AREA EVERY NIGHT AT BEDTIME. REFER TO PREACTICE HANDOUT FOR SPECIFIC INSTRUCTIONS. FOR THE FACE AND STRETCH MARKS., Disp: , Rfl:     Reviewed and assessed      Objective:  Last vitals and weight :       Exam:  - General: appears stated age and no distress  - Pulmonary: no signs of respiratory distress. Normal excursion of the chest.    Neurologic Exam  - Mental Status: Alert and attentive.  Pleasant and cooperative.  Fund of knowledge are excellent..  Speech is spontaneous, fluent, and prosodic. Comprehension and repetition intact. Phrase length and rate are normal.   - Cranial Nerves: No gaze preference. Visual fields: Able to see the examiner's entire face.  Pupils appear to be symmetric.  They appear to be equally round and reactive when the patient opens and closes eyes.  EOMI. No nystagmus. No ptosis.No pathological facial asymmetry. No flattening of the nasolabial fold. .  Hearing grossly intact.  Tongue midline.Palate and uvula elevate symmetrically.  Shoulder shrug symmetric.    - Motor:   normal bulk.  Moving all 4 extremities spontaneously and symmetrically.  No focal weakness.      Asterixis: No asterixis noted.   Tremor: None  - Sensory: Denies any deficits  Light touch: normal  - Cerebellum: No truncal ataxia. No titubations. No dysmetria, no dysdiadochokinesis. No overshoot.        Most recent lab results:   Reviewed and assessed  No results found for this or any previous visit (from the past 36 hours).    11/8/21  labs:  keppra level was 11.5    Lamictal level is 5.4    His lamictal level was 3.2 on 9/16/2021    His keppra level is 7.0mcg/mL from 7/2/2021    His Lamictal level is 1.6 MCG/ML.  This is from June 7.  His Keppra level is 4.7 MCG per mL.  This is also from June 7.        Diagnostic studies:  Performed an independent visualization of no imaging to review  Imaging revealed: No imaging to review    Assessment     Alexander Reese is a 29 year old  who presents for follow-up for  epilepsy and migraine without aura.. 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 He had severe cognitive side effects, and the medication was stopped. While in [place] in 2021 he then had multiple seizures requiring intubation at an outside hospital. He was started on Keppra but has had significant diarrhea, and thus was started on lamotrigine.  He had a 48-hour ambulatory EEG that was unremarkable.  He had an MRI brain with and without contrast epilepsy protocol that was also unremarkable.  He has been seizure-free.    Goal was for him to be on 200 mg of lamotrigine twice daily.  In November 2021 and his medication was uptitrated to 200 mg in the morning and 50 mg in the evening.  However, when his medication  was renewed, it was only refilled at 200 mg daily.    He had a level checked on 8/18/23 and was 3.2. He has been seizure free on 200 mg. Although typically patients are on bid dosing, he has been seizure free on 200 mg, therefore will keep him on 200 mg daily.       He will keep a headache diary.  If his headaches are increasing and he is having more migraine disability then consider preventative agent.  Will send in a prescription for sumatriptan for rescue.  Discussed lifestyle changes.  He will increase his hydration to about 1 gallon of water a day or 103 ounces a day.    Assessment & Plan  Epilepsy  Seizure-free since the last visit. Current medication regimen appears  effective.    Migraine without aura  Occasional migraines managed effectively with sumatriptan. No need for additional medication at this time.    Asthma  Asthma is well-controlled with as-needed albuterol inhaler, primarily before exercise. Propranolol is used for anxiety, which may interfere with albuterol's effectiveness.  - Advise caution with albuterol use when taking propranolol.    Obesity  Lost 24 pounds since February with Wegovy. Reports decreased appetite and reduced fast food consumption. No significant nausea or vomiting reported. Discussed potential benefits of treating obesity and challenges with insurance coverage for Wegovy.         Plan   1.Epilepsy  - ok to drive.  - LAMOTRIGINE (LAMICTAL), SERUM; Future  - lamoTRIgine 200 MG Oral Tab; Take 1 tablet (200 mg total) by mouth daily.  Dispense: 90 tablet; Refill: 3  - okay for once daily dosing.     2. Migraine without aura and without status migrainosus, not intractable   Cont. Sumatriptan   Diagnostics:  Imaging:None indicated.    Sleep study: not indicated  Maintain a daily headache diary  Therapeutics:   Preventative: none. Lifestyle changes.   An adequate trial of a preventative medication is 2 to 3 months at the target dose.  Rescue/Acute Therapy: Sumatriptan    Limit <2 times per week to avoid developing medication overuse headaches.  3. Lifestyle modifications:  Recommend  a stable daily schedule as changes in a pt's daily schedule trigger HAs.  This includes bedtime and wake up time, eating meals regularly, exercising, and maintaining a consistently low stress lifestyle.  Exercise: moderate intensity aerobic exercise (150 minutes/week in 3-5 sessions).  Practice good sleep hygiene. Maintain regular sleep cycles with bedtime and wake up times that do not differ significantly between weekdays and weekends.  Maintain hydration: Visit [employer] to determine the individual water needs based on gender, weight, weather, and level of  physical activity.  Avoid factors that increase the risk of developing more frequent headaches including caffeine, obesity, certain sleep disorders, and medication overuse.  To determine if caffeine avoidance will decrease HA burden pts must abstain for at least 2 to 3 months.  If obese or overweight consider weight loss which will decrease the number of headache days.  Simple analgesics are overused if taken on 15 or more days per month regardless of the reason.  All other medications are overused when taken on 10 or more days per month.  Avoid triggers.       Education Provided  Education/Instructions given to: patient   Barriers to Learning: None  Content: Refer to note above   Evaluation/Outcome: Verbalized understanding    This document is not intended to support charting by exception.  Sections left blank in a completed note should be presumed not to have been done.    Disclaimer:   This record was dictated using  Dragon software. There may be errors due to voice recognition problems that were not realized and corrected during the completion of the note.   care on the above.    Thank you for allowing me to participate in the care of your patient.    Wilberto Siegel   06/09/25

## 2025-06-24 NOTE — TELEPHONE ENCOUNTER
Patient for status of Rx, his last dose was last Friday and now out of Rx. I made him aware I will request as urgent as he is due for the next dose this Friday. Patient verbalized understanding. No further questions or concerns at this time.    Which GLP is the patient on: semaglutide-weight management 1.7 MG/0.75ML Subcutaneous Solution Auto-injector   Current dose: 1.7 mg  Patient seeking refill or increase to next dose: next dose  How many doses of current dose completed: 8 doses  Side effects, if any: none  Current weight / how much weight loss: total of 24 lbs, current weight 214 lbs  Last visit: 2/3/25    Future Appointments   Date Time Provider Department Center   9/18/2025  9:00 AM Faraz Suresh,  [facility] OP     Dr. Suresh  - please review and sign Rx, if appropriate [otherwise cancel pended Rx and advise]

## 2025-07-11 NOTE — TELEPHONE ENCOUNTER
Pt called in was advised by rx to contact dr office for refill for Lamotrigine 200mg oral tab. Confirmed [facility]  [id number] [place], [place] - [address] AT [facility] [address], [phone], [phone] . Pt requesting refill asap as he will be heading out of town on 7/15. Pls advise.

## (undated) DIAGNOSIS — G40.909 NONINTRACTABLE EPILEPSY WITHOUT STATUS EPILEPTICUS, UNSPECIFIED EPILEPSY TYPE (HCC): ICD-10-CM

## (undated) DIAGNOSIS — G40.909 NONINTRACTABLE EPILEPSY WITHOUT STATUS EPILEPTICUS, UNSPECIFIED EPILEPSY TYPE (HCC): Primary | ICD-10-CM

## (undated) NOTE — MR AVS SNAPSHOT
[facility]  [address], [address]  [phone]               Thank you for choosing us for your health care visit with Carrie Leung MD.  We are glad to serve you and happy to provide you with this summary o * Montelukast Sodium 10 MG Tabs   Take 1 tablet (10 mg total) by mouth nightly. Commonly known as:  SINGULAIR           * Montelukast Sodium 10 MG Tabs   Take 1 tablet (10 mg total) by mouth daily. Commonly known as:  SINGULAIR           * Notice:   Itz Nguyen Lifestyle Modification Recommendations:    Modification Recommendation   Weight Reduction Maintain normal body weight (body mass index 18.5-24.9 kg/m2)   DASH eating plan Adopt a diet rich in fruits, vegetables, and low fat dairy products with reduced cont

## (undated) NOTE — MR AVS SNAPSHOT
[facility]  [address]  [phone]               Thank you for choosing us for your health care visit with Danielle Acosta NP.   We are glad to serve you and happy to provide you with this summary of your vis bleeding, or are taking blood-thinning medicines, talk with your healthcare provider before using these medicines.) Aspirin should never be given to anyone under 25years of age who is ill with a viral infection or fever.  It may cause severe liver or brain Albuterol Sulfate  (90 Base) MCG/ACT Aers   Inhale 2 puffs into the lungs every 6 (six) hours as needed for Wheezing. Fluticasone Propionate 50 MCG/ACT Susp   2 sprays by Each Nare route daily.    Commonly known as:  FLONASE           * Mo If you have questions, you can call [phone] to talk to our [facility] Staff. Remember, "[employer]hart is NOT to be used for urgent needs. For medical emergencies, dial 911.         Educational Information     Healthy Diet and Regular Exercise  The [employer]

## (undated) NOTE — Clinical Note
ASTHMA ACTION PLAN for Payal Osei     : 1995     Date: 2017  Doctor:  Silvino Pugh MD  Phone for doctor or clinic: Darren Davis , [address], [address] , [address] [place] [address]  [phone]           ACT Albuterol Sulfate  (90 Base) MCG/ACT Inhalation Aero Soln Inhale 2 puffs into the lungs every 6 (six) hours as needed for Wheezing. 3. Red - Stop!  Danger! <50% Personal Best Peak Flow  Continue Controller Medications But ADD:   Med

## (undated) NOTE — LETTER
24        To Whom It May Concern:      Alexander Reese  :  1995    This patient is has been cleared to take stimulant medication for ADHD as long as he continues his anti-seizure medication. Patients on stimulants may have an increased risk of breakthrough seizures and it is possible he could have a breakthrough seizure. As long as the patient is aware of this risk, it is ok for him to start the stimulant medication.    If this office may be of further assistance, please do not hesitate to contact us.      Sincerely,    Wilberto Siegel,     Phone #: [phone]  Fax #: [phone]

## (undated) NOTE — LETTER
[facility]  [address]  [facility] [id number] [phone]        24        To Whom It May Concern:      Alexander Reese  :  1995    This patient has been cleared to take stimulant medication for ADHD as long as he continues his anti-seizure medication. Patients on stimulants may have an increased risk of breakthrough seizures and it is possible he could have a breakthrough seizure. As long as the patient is aware of this risk, it is ok for him to start the stimulant medication.    If this office may be of further assistance, please do not hesitate to contact us.      Sincerely,    Wilberto Siegel DO    Phone #: [phone]  Fax #: [phone]